# Patient Record
Sex: FEMALE | Race: BLACK OR AFRICAN AMERICAN | NOT HISPANIC OR LATINO | Employment: OTHER | URBAN - METROPOLITAN AREA
[De-identification: names, ages, dates, MRNs, and addresses within clinical notes are randomized per-mention and may not be internally consistent; named-entity substitution may affect disease eponyms.]

---

## 2022-08-17 ENCOUNTER — HOSPITAL ENCOUNTER (EMERGENCY)
Facility: HOSPITAL | Age: 67
Discharge: HOME | End: 2022-08-17
Attending: EMERGENCY MEDICINE
Payer: MEDICARE

## 2022-08-17 VITALS
DIASTOLIC BLOOD PRESSURE: 69 MMHG | HEART RATE: 92 BPM | SYSTOLIC BLOOD PRESSURE: 135 MMHG | TEMPERATURE: 99 F | RESPIRATION RATE: 16 BRPM | OXYGEN SATURATION: 99 %

## 2022-08-17 DIAGNOSIS — S09.90XA INJURY OF HEAD, INITIAL ENCOUNTER: Primary | ICD-10-CM

## 2022-08-17 PROCEDURE — 25000003 PHARM REV CODE 250: Performed by: EMERGENCY MEDICINE

## 2022-08-17 PROCEDURE — 99283 EMERGENCY DEPT VISIT LOW MDM: CPT

## 2022-08-17 RX ORDER — HYDROCODONE BITARTRATE AND ACETAMINOPHEN 5; 325 MG/1; MG/1
1 TABLET ORAL
Status: COMPLETED | OUTPATIENT
Start: 2022-08-17 | End: 2022-08-17

## 2022-08-17 RX ORDER — HYDROCODONE BITARTRATE AND ACETAMINOPHEN 5; 325 MG/1; MG/1
1 TABLET ORAL EVERY 8 HOURS PRN
Qty: 12 TABLET | Refills: 0 | Status: SHIPPED | OUTPATIENT
Start: 2022-08-17 | End: 2022-08-27

## 2022-08-17 RX ADMIN — HYDROCODONE BITARTRATE AND ACETAMINOPHEN 1 TABLET: 5; 325 TABLET ORAL at 11:08

## 2022-08-18 NOTE — ED PROVIDER NOTES
SCRIBE #1 NOTE: I, Luis Calderon, am scribing for, and in the presence of, Selin Torres MD. I have scribed the entire note.       History     Chief Complaint   Patient presents with    Head Injury     Wheelchair fell backwards on the [employer] bus. C/o pain to head, back, and neck. Denies blood thinners and LOC      Chest Pain     Mid sternal, no radiation, denies SOB     Review of patient's allergies indicates:  No Known Allergies      History of Present Illness     HPI    8/17/2022, 11:22 PM  History obtained from the patient      History of Present Illness: Fariha Corral is a 67 y.o. female patient who presents to the Emergency Department for evaluation of a head injury which onset suddenly at 16:30 today. Pt states she was in a wheel chair when she fell backwards on the [employer] bus. She c/o head, neck, and back pain. Symptoms are constant and moderate in severity. No mitigating or exacerbating factors reported. No associated sxs reported. Patient denies any syncope, weakness, numbness, dizziness, CP, SOB, abdominal pain, n/v/d, and all other sxs at this time. No prior Tx reported. No further complaints or concerns at this time.       Arrival mode: Personal vehicle    PCP: Primary Doctor No        Past Medical History:  No past medical history on file.    Past Surgical History:  No past surgical history on file.      Family History:  No family history on file.    Social History:  Social History     Tobacco Use    Smoking status: Not on file    Smokeless tobacco: Not on file   Substance and Sexual Activity    Alcohol use: Not on file    Drug use: Not on file    Sexual activity: Not on file        Review of Systems     Review of Systems   Constitutional: Negative for fever.   HENT: Negative for sore throat.    Respiratory: Negative for shortness of breath.    Cardiovascular: Negative for chest pain.   Gastrointestinal: Negative for abdominal pain, diarrhea, nausea and vomiting.   Genitourinary: Negative for  dysuria.   Musculoskeletal: Negative for back pain.        (+) Pain to occipital area of head, neck, and back   Skin: Negative for rash.   Neurological: Negative for dizziness, syncope, weakness and numbness.   Hematological: Does not bruise/bleed easily.   All other systems reviewed and are negative.     Physical Exam     Initial Vitals [08/17/22 2055]   BP Pulse Resp Temp SpO2   135/69 92 19 99.2 °F (37.3 °C) 99 %      MAP       --          Physical Exam   Nursing Notes and Vital Signs Reviewed.  Constitutional: Patient is in no acute distress. Well-developed and well-nourished.  Head: Mild swelling and tenderness to occipital area. Normocephalic.  Eyes: PERRL. EOM intact. Conjunctivae are not pale. No scleral icterus.  ENT: Mucous membranes are moist. Oropharynx is clear and symmetric.    Neck: Supple. Full ROM. No lymphadenopathy.  Cardiovascular: Regular rate. Regular rhythm. No murmurs, rubs, or gallops. Distal pulses are 2+ and symmetric.  Pulmonary/Chest: No respiratory distress. Clear to auscultation bilaterally. No wheezing or rales.  Abdominal: Soft and non-distended.  There is no tenderness.  No rebound, guarding, or rigidity. Good bowel sounds.  Genitourinary: No CVA tenderness  Musculoskeletal: Moves all extremities. No obvious deformities. No edema. No calf tenderness.  Skin: Warm and dry. LUE AV shunt has thrill.  Neurological:  Alert, awake, and appropriate.  Normal speech.  No acute focal neurological deficits are appreciated.  Psychiatric: Normal affect. Good eye contact. Appropriate in content.     ED Course   Procedures  ED Vital Signs:  Vitals:    08/17/22 2055 08/17/22 2332   BP: 135/69    Pulse: 92    Resp: 19 16   Temp: 99.2 °F (37.3 °C)    TempSrc: Oral    SpO2: 99%        Abnormal Lab Results:  Labs Reviewed - No data to display     All Lab Results:  None    Imaging Results:  Imaging Results    None        The EKG was ordered, reviewed, and independently interpreted by the ED  provider.  Interpretation time: 20:47  Rate: 90 BPM  Rhythm: Sinus rhythm with short VA  Interpretation: Possible Left atrial enlargement. St and T wave abnormality. No STEMI.        The Emergency Provider reviewed the vital signs and test results, which are outlined above.     ED Discussion       11:24 PM: Reassessed pt at this time. Discussed with pt all pertinent ED information and results. Discussed pt dx and plan of tx. Gave pt all f/u and return to the ED instructions. All questions and concerns were addressed at this time. Pt expresses understanding of information and instructions, and is comfortable with plan to discharge. Pt is stable for discharge.    I discussed with patient and/or family/caretaker that evaluation in the ED does not suggest any emergent or life threatening medical conditions requiring immediate intervention beyond what was provided in the ED, and I believe patient is safe for discharge.  Regardless, an unremarkable evaluation in the ED does not preclude the development or presence of a serious of life threatening condition. As such, patient was instructed to return immediately for any worsening or change in current symptoms.         Medical Decision Making:   Clinical Tests:   Medical Tests: Ordered and Reviewed           ED Medication(s):  Medications   HYDROcodone-acetaminophen 5-325 mg per tablet 1 tablet (1 tablet Oral Given 8/17/22 2332)       Discharge Medication List as of 8/17/2022 11:23 PM      START taking these medications    Details   HYDROcodone-acetaminophen (NORCO) 5-325 mg per tablet Take 1 tablet by mouth every 8 (eight) hours as needed for Pain., Starting Wed 8/17/2022, Until Sat 8/27/2022 at 2359, Print              Follow-up Information     [facility] In 2 days.    Contact information:  [address] [facility] [address] [phone]             [facility] - Emergency Dept..    Specialty: Emergency Medicine  Why: As needed, If symptoms worsen  Contact  information:  [address] [facility] [id number] [phone]                           Scribe Attestation:   Scribe #1: I performed the above scribed service and the documentation accurately describes the services I performed. I attest to the accuracy of the note.     Attending:   Physician Attestation Statement for Scribe #1: I, Selin Torres MD, personally performed the services described in this documentation, as scribed by Luis Calderon, in my presence, and it is both accurate and complete.           Clinical Impression       ICD-10-CM ICD-9-CM   1. Injury of head, initial encounter  S09.90XA 959.01       Disposition:   Disposition: Discharged  Condition: Stable         Selin Torres MD  08/18/22 0595

## 2022-09-29 ENCOUNTER — OFFICE VISIT (OUTPATIENT)
Dept: INTERNAL MEDICINE | Facility: CLINIC | Age: 67
End: 2022-09-29
Payer: MEDICARE

## 2022-09-29 ENCOUNTER — TELEPHONE (OUTPATIENT)
Dept: HEMATOLOGY/ONCOLOGY | Facility: CLINIC | Age: 67
End: 2022-09-29
Payer: MEDICARE

## 2022-09-29 ENCOUNTER — TELEPHONE (OUTPATIENT)
Dept: GASTROENTEROLOGY | Facility: CLINIC | Age: 67
End: 2022-09-29
Payer: MEDICARE

## 2022-09-29 ENCOUNTER — TELEPHONE (OUTPATIENT)
Dept: INTERNAL MEDICINE | Facility: CLINIC | Age: 67
End: 2022-09-29

## 2022-09-29 VITALS
HEART RATE: 83 BPM | SYSTOLIC BLOOD PRESSURE: 170 MMHG | OXYGEN SATURATION: 95 % | TEMPERATURE: 97 F | DIASTOLIC BLOOD PRESSURE: 80 MMHG

## 2022-09-29 DIAGNOSIS — N18.6 ESRD (END STAGE RENAL DISEASE): ICD-10-CM

## 2022-09-29 DIAGNOSIS — D64.9 ANEMIA, UNSPECIFIED TYPE: Primary | ICD-10-CM

## 2022-09-29 DIAGNOSIS — Z11.59 NEED FOR HEPATITIS C SCREENING TEST: ICD-10-CM

## 2022-09-29 DIAGNOSIS — N18.9 CKD (CHRONIC KIDNEY DISEASE): ICD-10-CM

## 2022-09-29 DIAGNOSIS — Z99.2 ESRD (END STAGE RENAL DISEASE) ON DIALYSIS: ICD-10-CM

## 2022-09-29 DIAGNOSIS — N18.6 ESRD (END STAGE RENAL DISEASE) ON DIALYSIS: ICD-10-CM

## 2022-09-29 DIAGNOSIS — Z79.899 ENCOUNTER FOR LONG-TERM (CURRENT) USE OF MEDICATIONS: ICD-10-CM

## 2022-09-29 DIAGNOSIS — R52 PHANTOM PAIN: ICD-10-CM

## 2022-09-29 DIAGNOSIS — D64.9 ANEMIA: Primary | ICD-10-CM

## 2022-09-29 DIAGNOSIS — I73.9 PAD (PERIPHERAL ARTERY DISEASE): ICD-10-CM

## 2022-09-29 DIAGNOSIS — G54.8 PHANTOM PAIN: ICD-10-CM

## 2022-09-29 DIAGNOSIS — Z87.19 HISTORY OF GI BLEED: ICD-10-CM

## 2022-09-29 PROCEDURE — 1159F PR MEDICATION LIST DOCUMENTED IN MEDICAL RECORD: ICD-10-PCS | Mod: CPTII,S$GLB,, | Performed by: FAMILY MEDICINE

## 2022-09-29 PROCEDURE — 99204 OFFICE O/P NEW MOD 45 MIN: CPT | Mod: S$GLB,,, | Performed by: FAMILY MEDICINE

## 2022-09-29 PROCEDURE — 3288F FALL RISK ASSESSMENT DOCD: CPT | Mod: CPTII,S$GLB,, | Performed by: FAMILY MEDICINE

## 2022-09-29 PROCEDURE — 3079F PR MOST RECENT DIASTOLIC BLOOD PRESSURE 80-89 MM HG: ICD-10-PCS | Mod: CPTII,S$GLB,, | Performed by: FAMILY MEDICINE

## 2022-09-29 PROCEDURE — 1101F PR PT FALLS ASSESS DOC 0-1 FALLS W/OUT INJ PAST YR: ICD-10-PCS | Mod: CPTII,S$GLB,, | Performed by: FAMILY MEDICINE

## 2022-09-29 PROCEDURE — 99999 PR PBB SHADOW E&M-EST. PATIENT-LVL IV: CPT | Mod: PBBFAC,,, | Performed by: FAMILY MEDICINE

## 2022-09-29 PROCEDURE — 4010F ACE/ARB THERAPY RXD/TAKEN: CPT | Mod: CPTII,S$GLB,, | Performed by: FAMILY MEDICINE

## 2022-09-29 PROCEDURE — 99204 PR OFFICE/OUTPT VISIT, NEW, LEVL IV, 45-59 MIN: ICD-10-PCS | Mod: S$GLB,,, | Performed by: FAMILY MEDICINE

## 2022-09-29 PROCEDURE — 3077F PR MOST RECENT SYSTOLIC BLOOD PRESSURE >= 140 MM HG: ICD-10-PCS | Mod: CPTII,S$GLB,, | Performed by: FAMILY MEDICINE

## 2022-09-29 PROCEDURE — 4010F PR ACE/ARB THEARPY RXD/TAKEN: ICD-10-PCS | Mod: CPTII,S$GLB,, | Performed by: FAMILY MEDICINE

## 2022-09-29 PROCEDURE — 3079F DIAST BP 80-89 MM HG: CPT | Mod: CPTII,S$GLB,, | Performed by: FAMILY MEDICINE

## 2022-09-29 PROCEDURE — 1159F MED LIST DOCD IN RCRD: CPT | Mod: CPTII,S$GLB,, | Performed by: FAMILY MEDICINE

## 2022-09-29 PROCEDURE — 99999 PR PBB SHADOW E&M-EST. PATIENT-LVL IV: ICD-10-PCS | Mod: PBBFAC,,, | Performed by: FAMILY MEDICINE

## 2022-09-29 PROCEDURE — 3077F SYST BP >= 140 MM HG: CPT | Mod: CPTII,S$GLB,, | Performed by: FAMILY MEDICINE

## 2022-09-29 PROCEDURE — 1101F PT FALLS ASSESS-DOCD LE1/YR: CPT | Mod: CPTII,S$GLB,, | Performed by: FAMILY MEDICINE

## 2022-09-29 PROCEDURE — 3288F PR FALLS RISK ASSESSMENT DOCUMENTED: ICD-10-PCS | Mod: CPTII,S$GLB,, | Performed by: FAMILY MEDICINE

## 2022-09-29 RX ORDER — CARVEDILOL 12.5 MG/1
12.5 TABLET ORAL 2 TIMES DAILY
COMMUNITY
Start: 2022-05-26

## 2022-09-29 RX ORDER — PAROXETINE 10 MG/1
10 TABLET, FILM COATED ORAL DAILY
COMMUNITY
Start: 2022-09-26

## 2022-09-29 RX ORDER — CALCIUM ACETATE 667 MG/1
667 CAPSULE ORAL
COMMUNITY
Start: 2022-07-13 | End: 2023-07-13

## 2022-09-29 RX ORDER — ATORVASTATIN CALCIUM 40 MG/1
TABLET, FILM COATED ORAL
COMMUNITY
Start: 2022-09-21

## 2022-09-29 RX ORDER — PANTOPRAZOLE SODIUM 40 MG/1
40 TABLET, DELAYED RELEASE ORAL
COMMUNITY
Start: 2022-07-13 | End: 2022-09-29 | Stop reason: SDUPTHER

## 2022-09-29 RX ORDER — FERROUS SULFATE 325(65) MG
TABLET ORAL
COMMUNITY

## 2022-09-29 RX ORDER — SODIUM ZIRCONIUM CYCLOSILICATE 5 G/5G
POWDER, FOR SUSPENSION ORAL
COMMUNITY
Start: 2022-05-09

## 2022-09-29 RX ORDER — SPIRONOLACTONE 50 MG/1
50 TABLET, FILM COATED ORAL DAILY
COMMUNITY
Start: 2022-09-21

## 2022-09-29 RX ORDER — GABAPENTIN 300 MG/1
300 CAPSULE ORAL 3 TIMES DAILY
Qty: 90 CAPSULE | Refills: 1 | Status: SHIPPED | OUTPATIENT
Start: 2022-09-29 | End: 2023-09-29

## 2022-09-29 RX ORDER — PANTOPRAZOLE SODIUM 40 MG/1
40 TABLET, DELAYED RELEASE ORAL 2 TIMES DAILY
Qty: 60 TABLET | Refills: 3 | Status: SHIPPED | OUTPATIENT
Start: 2022-09-29

## 2022-09-29 RX ORDER — AMLODIPINE BESYLATE 10 MG/1
TABLET ORAL
COMMUNITY

## 2022-09-29 RX ORDER — HYDROCODONE BITARTRATE AND ACETAMINOPHEN 5; 325 MG/1; MG/1
1 TABLET ORAL EVERY 8 HOURS PRN
COMMUNITY
Start: 2022-09-22

## 2022-09-29 RX ORDER — GABAPENTIN 300 MG/1
300 CAPSULE ORAL 3 TIMES DAILY
Qty: 90 CAPSULE | Refills: 1 | Status: SHIPPED | OUTPATIENT
Start: 2022-09-29 | End: 2022-09-29 | Stop reason: SDUPTHER

## 2022-09-29 NOTE — PROGRESS NOTES
Subjective:       Patient ID: Fariha Corral is a 67 y.o. female.    Chief Complaint: Establish Care    HPI          Past Medical History:   Diagnosis Date    Acid reflux     Chronic kidney disease, unspecified     Hypertension    ESRD  PAD  Upper GI  Anemia      Past Surgical History:   Procedure Laterality Date    AMPUTATION      HYSTERECTOMY         History reviewed. No pertinent family history.    Social History     Tobacco Use   Smoking Status Every Day    Types: Cigarettes   Smokeless Tobacco Current   Still smoking daily  1.5 /day      Wt Readings from Last 5 Encounters:   No data found for Wt       For further HPI details, see assessment and plan.    Review of Systems   Constitutional:  Negative for chills and fever.   Respiratory:  Negative for shortness of breath.    Cardiovascular:  Negative for chest pain.   Psychiatric/Behavioral:  Negative for confusion.      Objective:      Vitals:    09/29/22 0955   BP: (!) 170/80   Pulse: 83   Temp: 97 °F (36.1 °C)       Physical Exam  Constitutional:       General: She is not in acute distress.     Appearance: She is not ill-appearing.      Comments: In wheelchait. Double amputee - lower extremity   Pulmonary:      Effort: Pulmonary effort is normal. No respiratory distress.   Neurological:      General: No focal deficit present.      Mental Status: She is alert.   Psychiatric:         Mood and Affect: Mood normal.         Behavior: Behavior normal.       Assessment:       1. Anemia, unspecified type    2. History of GI bleed    3. ESRD (end stage renal disease)    4. Need for hepatitis C screening test    5. Phantom pain    6. PAD (peripheral artery disease)    7. Encounter for long-term (current) use of medications        Plan:         Patient presents with her niece to establish care.  It sounds as if she just moved from [place] and needs new doctors.  At present she is feeling without any complaints but does have concerns about her blood count as primary  issue.      She is recently had blood transfusions at outside hospitals.  She tells me she does not know why she has had such anemia but on further discussion she does have a history of GI bleeding.  It sounds as if it was upper GI bleeding.  I am going to get her started on b.i.d. Protonix and consult Gastroenterology for the next available appointment.  I have asked staff to arrange GI soon as possible.  Additionally we will consult Hematology for assistance in raising her blood counts.    Her blood pressures are uncontrolled today.  She reports they are widely variable and sometimes are controlled.  We will need to review this further and I suspect we will need to adjust her current medications.    Patient is a bilateral amputee.  She reports she had her legs amputated due to peripheral arterial disease.  Since that time she has suffered with phantom pain issues.  Gabapentin works well for her and she tolerates the medication at 300 mg twice daily.  I will refill that medication today.    Patient has end-stage renal disease on dialysis.  I can not see who her nephrologist is and she does not know.  Given she is transferring all of her care to [facility] we will get her established with Nephrology.  Until that time continue her current dialysis program.    Patient is a long-time heavy smoker.  This is exceedingly concerning given her clinical history.  She does think she will quit again in the future.  I strongly encouraged this.  We will explore pharmaceutical assistance if need be.    Rj request home health for assistance.  I will place an order and has come held to identify in which ways they can provide assistance for this patient.  I specifically asking for social work assistance to identify programs that are available to her.    Ms. Pemberton is a pleasant but complicated case.  To further complicate our establish care visit she missed her appointment time.  She was 30 minutes late to her 20 minute appointment.   I politely but firmly ask that they present on time for future visits.    Given the severe time limitations of today's visit I do ask they return in 1-2 weeks to further review her history in more properly establish care with 1 another.    Patient may be a good candidate for the [facility] program.  I will discuss this with him at our next visit.    Note dictated - excuse typos.

## 2022-09-29 NOTE — TELEPHONE ENCOUNTER
Spoke patient in reference to Hematology referral from Dr. Vasques.  Appointment scheduled per patient's request next available Tuesday/Thursday at the [facility].  Appointment notice mailed.

## 2022-09-29 NOTE — TELEPHONE ENCOUNTER
----- Message from Marcy Burgos sent at 9/29/2022  9:27 AM CDT -----  Contact: Thresa/ Daughter  Patients daughter is calling to speak to the nurse regarding today's appt. Reports patient was late and wanted to know if the patient can still be seen. Please give patient a call back at [phone]

## 2022-09-30 DIAGNOSIS — N18.30 STAGE 3 CHRONIC KIDNEY DISEASE, UNSPECIFIED WHETHER STAGE 3A OR 3B CKD: Primary | ICD-10-CM

## 2022-10-01 PROCEDURE — G0180 PR HOME HEALTH MD CERTIFICATION: ICD-10-PCS | Mod: ,,, | Performed by: FAMILY MEDICINE

## 2022-10-01 PROCEDURE — G0180 MD CERTIFICATION HHA PATIENT: HCPCS | Mod: ,,, | Performed by: FAMILY MEDICINE

## 2022-10-03 ENCOUNTER — LAB VISIT (OUTPATIENT)
Dept: LAB | Facility: HOSPITAL | Age: 67
End: 2022-10-03
Attending: FAMILY MEDICINE
Payer: MEDICARE

## 2022-10-03 ENCOUNTER — OFFICE VISIT (OUTPATIENT)
Dept: GASTROENTEROLOGY | Facility: CLINIC | Age: 67
End: 2022-10-03
Payer: MEDICARE

## 2022-10-03 VITALS
WEIGHT: 110 LBS | OXYGEN SATURATION: 94 % | DIASTOLIC BLOOD PRESSURE: 70 MMHG | SYSTOLIC BLOOD PRESSURE: 148 MMHG | HEART RATE: 82 BPM

## 2022-10-03 DIAGNOSIS — Z79.899 ENCOUNTER FOR LONG-TERM (CURRENT) USE OF MEDICATIONS: ICD-10-CM

## 2022-10-03 DIAGNOSIS — D64.9 SEVERE ANEMIA: ICD-10-CM

## 2022-10-03 DIAGNOSIS — D64.9 ANEMIA: ICD-10-CM

## 2022-10-03 DIAGNOSIS — N18.30 STAGE 3 CHRONIC KIDNEY DISEASE, UNSPECIFIED WHETHER STAGE 3A OR 3B CKD: ICD-10-CM

## 2022-10-03 DIAGNOSIS — Z99.2 ESRD (END STAGE RENAL DISEASE) ON DIALYSIS: ICD-10-CM

## 2022-10-03 DIAGNOSIS — D64.9 ANEMIA, UNSPECIFIED TYPE: ICD-10-CM

## 2022-10-03 DIAGNOSIS — Z87.19 HISTORY OF GI BLEED: ICD-10-CM

## 2022-10-03 DIAGNOSIS — N18.6 ESRD ON DIALYSIS: ICD-10-CM

## 2022-10-03 DIAGNOSIS — N18.6 ESRD (END STAGE RENAL DISEASE): ICD-10-CM

## 2022-10-03 DIAGNOSIS — K31.811 AVM (ARTERIOVENOUS MALFORMATION) OF DUODENUM, ACQUIRED WITH HEMORRHAGE: Primary | ICD-10-CM

## 2022-10-03 DIAGNOSIS — Z11.59 NEED FOR HEPATITIS C SCREENING TEST: ICD-10-CM

## 2022-10-03 DIAGNOSIS — Z99.2 ESRD ON DIALYSIS: ICD-10-CM

## 2022-10-03 DIAGNOSIS — N18.6 ESRD (END STAGE RENAL DISEASE) ON DIALYSIS: ICD-10-CM

## 2022-10-03 DIAGNOSIS — I73.9 PAD (PERIPHERAL ARTERY DISEASE): ICD-10-CM

## 2022-10-03 LAB
BASOPHILS # BLD AUTO: 0.04 K/UL (ref 0–0.2)
BASOPHILS NFR BLD: 0.6 % (ref 0–1.9)
CREAT UR-MCNC: 51 MG/DL (ref 15–325)
DIFFERENTIAL METHOD: ABNORMAL
EOSINOPHIL # BLD AUTO: 0.1 K/UL (ref 0–0.5)
EOSINOPHIL NFR BLD: 0.9 % (ref 0–8)
ERYTHROCYTE [DISTWIDTH] IN BLOOD BY AUTOMATED COUNT: 18.6 % (ref 11.5–14.5)
ESTIMATED AVG GLUCOSE: 74 MG/DL (ref 68–131)
HBA1C MFR BLD: 4.2 % (ref 4–5.6)
HCT VFR BLD AUTO: 24.8 % (ref 37–48.5)
HGB BLD-MCNC: 7.4 G/DL (ref 12–16)
IMM GRANULOCYTES # BLD AUTO: 0.05 K/UL (ref 0–0.04)
IMM GRANULOCYTES NFR BLD AUTO: 0.7 % (ref 0–0.5)
LYMPHOCYTES # BLD AUTO: 0.6 K/UL (ref 1–4.8)
LYMPHOCYTES NFR BLD: 9.1 % (ref 18–48)
MCH RBC QN AUTO: 32 PG (ref 27–31)
MCHC RBC AUTO-ENTMCNC: 29.8 G/DL (ref 32–36)
MCV RBC AUTO: 107 FL (ref 82–98)
MONOCYTES # BLD AUTO: 0.5 K/UL (ref 0.3–1)
MONOCYTES NFR BLD: 7.2 % (ref 4–15)
NEUTROPHILS # BLD AUTO: 5.5 K/UL (ref 1.8–7.7)
NEUTROPHILS NFR BLD: 81.5 % (ref 38–73)
NRBC BLD-RTO: 0 /100 WBC
PLATELET # BLD AUTO: 233 K/UL (ref 150–450)
PMV BLD AUTO: 11.1 FL (ref 9.2–12.9)
PROT UR-MCNC: 317 MG/DL (ref 0–15)
PROT/CREAT UR: 6.22 MG/G{CREAT} (ref 0–0.2)
RBC # BLD AUTO: 2.31 M/UL (ref 4–5.4)
WBC # BLD AUTO: 6.79 K/UL (ref 3.9–12.7)

## 2022-10-03 PROCEDURE — 4010F ACE/ARB THERAPY RXD/TAKEN: CPT | Mod: CPTII,S$GLB,, | Performed by: NURSE PRACTITIONER

## 2022-10-03 PROCEDURE — 99999 PR PBB SHADOW E&M-EST. PATIENT-LVL V: ICD-10-PCS | Mod: PBBFAC,,, | Performed by: NURSE PRACTITIONER

## 2022-10-03 PROCEDURE — 1159F MED LIST DOCD IN RCRD: CPT | Mod: CPTII,S$GLB,, | Performed by: NURSE PRACTITIONER

## 2022-10-03 PROCEDURE — 83036 HEMOGLOBIN GLYCOSYLATED A1C: CPT | Performed by: FAMILY MEDICINE

## 2022-10-03 PROCEDURE — 86803 HEPATITIS C AB TEST: CPT | Performed by: FAMILY MEDICINE

## 2022-10-03 PROCEDURE — 82746 ASSAY OF FOLIC ACID SERUM: CPT | Performed by: NURSE PRACTITIONER

## 2022-10-03 PROCEDURE — 4010F PR ACE/ARB THEARPY RXD/TAKEN: ICD-10-PCS | Mod: CPTII,S$GLB,, | Performed by: NURSE PRACTITIONER

## 2022-10-03 PROCEDURE — 99999 PR PBB SHADOW E&M-EST. PATIENT-LVL V: CPT | Mod: PBBFAC,,, | Performed by: NURSE PRACTITIONER

## 2022-10-03 PROCEDURE — 80061 LIPID PANEL: CPT | Performed by: FAMILY MEDICINE

## 2022-10-03 PROCEDURE — 1126F PR PAIN SEVERITY QUANTIFIED, NO PAIN PRESENT: ICD-10-PCS | Mod: CPTII,S$GLB,, | Performed by: NURSE PRACTITIONER

## 2022-10-03 PROCEDURE — 3077F PR MOST RECENT SYSTOLIC BLOOD PRESSURE >= 140 MM HG: ICD-10-PCS | Mod: CPTII,S$GLB,, | Performed by: NURSE PRACTITIONER

## 2022-10-03 PROCEDURE — 1159F PR MEDICATION LIST DOCUMENTED IN MEDICAL RECORD: ICD-10-PCS | Mod: CPTII,S$GLB,, | Performed by: NURSE PRACTITIONER

## 2022-10-03 PROCEDURE — 36415 COLL VENOUS BLD VENIPUNCTURE: CPT | Performed by: NURSE PRACTITIONER

## 2022-10-03 PROCEDURE — 1126F AMNT PAIN NOTED NONE PRSNT: CPT | Mod: CPTII,S$GLB,, | Performed by: NURSE PRACTITIONER

## 2022-10-03 PROCEDURE — 99204 OFFICE O/P NEW MOD 45 MIN: CPT | Mod: S$GLB,,, | Performed by: NURSE PRACTITIONER

## 2022-10-03 PROCEDURE — 84156 ASSAY OF PROTEIN URINE: CPT | Performed by: INTERNAL MEDICINE

## 2022-10-03 PROCEDURE — 82607 VITAMIN B-12: CPT | Performed by: NURSE PRACTITIONER

## 2022-10-03 PROCEDURE — 3078F DIAST BP <80 MM HG: CPT | Mod: CPTII,S$GLB,, | Performed by: NURSE PRACTITIONER

## 2022-10-03 PROCEDURE — 84466 ASSAY OF TRANSFERRIN: CPT | Performed by: FAMILY MEDICINE

## 2022-10-03 PROCEDURE — 85025 COMPLETE CBC W/AUTO DIFF WBC: CPT | Performed by: NURSE PRACTITIONER

## 2022-10-03 PROCEDURE — 1160F RVW MEDS BY RX/DR IN RCRD: CPT | Mod: CPTII,S$GLB,, | Performed by: NURSE PRACTITIONER

## 2022-10-03 PROCEDURE — 3078F PR MOST RECENT DIASTOLIC BLOOD PRESSURE < 80 MM HG: ICD-10-PCS | Mod: CPTII,S$GLB,, | Performed by: NURSE PRACTITIONER

## 2022-10-03 PROCEDURE — 99204 PR OFFICE/OUTPT VISIT, NEW, LEVL IV, 45-59 MIN: ICD-10-PCS | Mod: S$GLB,,, | Performed by: NURSE PRACTITIONER

## 2022-10-03 PROCEDURE — 1160F PR REVIEW ALL MEDS BY PRESCRIBER/CLIN PHARMACIST DOCUMENTED: ICD-10-PCS | Mod: CPTII,S$GLB,, | Performed by: NURSE PRACTITIONER

## 2022-10-03 PROCEDURE — 82728 ASSAY OF FERRITIN: CPT | Performed by: FAMILY MEDICINE

## 2022-10-03 PROCEDURE — 3077F SYST BP >= 140 MM HG: CPT | Mod: CPTII,S$GLB,, | Performed by: NURSE PRACTITIONER

## 2022-10-03 RX ORDER — POLYETHYLENE GLYCOL 3350, SODIUM SULFATE ANHYDROUS, SODIUM BICARBONATE, SODIUM CHLORIDE, POTASSIUM CHLORIDE 236; 22.74; 6.74; 5.86; 2.97 G/4L; G/4L; G/4L; G/4L; G/4L
4 POWDER, FOR SOLUTION ORAL ONCE
Qty: 4000 ML | Refills: 0 | Status: SHIPPED | OUTPATIENT
Start: 2022-10-03 | End: 2022-10-03

## 2022-10-03 NOTE — PROGRESS NOTES
Clinic Consult:  [facility] Consultation Note    Reason for Consult:  The primary encounter diagnosis was AVM (arteriovenous malformation) of duodenum, acquired with hemorrhage. Diagnoses of Severe anemia, ESRD on dialysis, and History of GI bleed were also pertinent to this visit.    PCP: Primary Doctor No   [id number] / [place] [address]    HPI:  This is a 67 y.o. female here for evaluation of UGI bleed.   She has recently relocated from [place] earlier this year.   She has a history of ESRD on HD (MWF), DM type 2, PVD with bilateral AKA, HTN, HLD.     She reports having been diagnosed with UGI bleed while being followed by Gastroenterologist in [place], [place]. She does not know their name or when her last EGD and colonoscopy was with them. She reports having chronic hematemesis with black emesis while living there that would occur intermittently.     Upon quick Internet search, I believe she may have been seeing:  [facility]  Address: [address] [place] [address], [place] [address]  Phone: [phone]    In July 2022, she was admitted to [facility] for severe anemia with Hgb 2.9  Hospital notes reviewed. She was having abdominal pain at that time with melena. She received multiple units of blood during that hospital stay.   She had inpatient EGD that showed actively bleeding duodenal AVM that was treated with cautery and clip. She was also placed on PPI.     On 9/27/22 she was sent back to ED for Hgb 7.0. She received 1 unit PRBC with increase in hgb to 8.3. She reports having melena a couple of weeks ago but is no longer having any overt GI bleeding. No hematemesis.     She was referred for evaluation of continued GI bleeding as the cause of recurrent anemia. Her anemia is macrocytic. She has iron studies pending. She denies any NSAID use or blood thinner use.     Review of Systems   Constitutional:  Positive for malaise/fatigue. Negative for fever and weight  loss.   HENT:  Negative for sore throat.    Respiratory:  Negative for cough and wheezing.    Cardiovascular:  Negative for chest pain and palpitations.   Gastrointestinal:  Positive for melena. Negative for abdominal pain, blood in stool, constipation, diarrhea, heartburn, nausea and vomiting.   Genitourinary:  Negative for dysuria and frequency.   Musculoskeletal:  Negative for back pain, joint pain, myalgias and neck pain.   Skin:  Negative for itching and rash.   Neurological:  Negative for dizziness, speech change, seizures, loss of consciousness and headaches.   Psychiatric/Behavioral:  Negative for depression and substance abuse. The patient is not nervous/anxious.      Medical History:  has a past medical history of Acid reflux, Chronic kidney disease, unspecified, and Hypertension.    Surgical History:  has a past surgical history that includes Hysterectomy and Amputation.    Family History: family history is not on file..     Social History:  reports that she has been smoking cigarettes. She uses smokeless tobacco. She reports that she does not drink alcohol.    Allergies: Reviewed    Home Medications:   Current Outpatient Medications on File Prior to Visit   Medication Sig Dispense Refill    amLODIPine (NORVASC) 10 MG tablet amlodipine 10 mg tablet      atorvastatin (LIPITOR) 40 MG tablet SMARTSI Tablet(s) By Mouth Every Evening      calcium acetate,phosphat bind, (PHOSLO) 667 mg capsule Take 667 mg by mouth.      carvediloL (COREG) 12.5 MG tablet Take 12.5 mg by mouth 2 (two) times daily.      ferrous sulfate (FEOSOL) 325 mg (65 mg iron) Tab tablet ferrous sulfate 325 mg (65 mg iron) tablet   TAKE ONE TABLET BY MOUTH ONE TIME DAILY FOR 30 DAYS      gabapentin (NEURONTIN) 300 MG capsule Take 1 capsule (300 mg total) by mouth 3 (three) times daily. 90 capsule 1    HYDROcodone-acetaminophen (NORCO) 5-325 mg per tablet Take 1 tablet by mouth every 8 (eight) hours as needed.      LOKELMA 5 gram packet Take  by mouth 3 (three) times a week.      pantoprazole (PROTONIX) 40 MG tablet Take 1 tablet (40 mg total) by mouth 2 (two) times daily. 60 tablet 3    paroxetine (PAXIL) 10 MG tablet Take 10 mg by mouth once daily.      spironolactone (ALDACTONE) 50 MG tablet Take 50 mg by mouth once daily.       No current facility-administered medications on file prior to visit.       Physical Exam:  BP (!) 148/70   Pulse 82   Wt 49.9 kg (110 lb)   SpO2 (!) 94%   There is no height or weight on file to calculate BMI.  Physical Exam  Constitutional:       General: She is not in acute distress.  HENT:      Head: Normocephalic and atraumatic.   Eyes:      General: No scleral icterus.     Conjunctiva/sclera: Conjunctivae normal.   Cardiovascular:      Rate and Rhythm: Normal rate and regular rhythm.      Heart sounds: No murmur heard.  Pulmonary:      Effort: Pulmonary effort is normal. No respiratory distress.      Breath sounds: Normal breath sounds. No wheezing.   Abdominal:      General: Abdomen is flat. Bowel sounds are normal.      Palpations: Abdomen is soft.      Tenderness: There is no abdominal tenderness.   Musculoskeletal:      Right Lower Extremity: Right leg is amputated above knee.      Left Lower Extremity: Left leg is amputated above knee.   Skin:     General: Skin is warm and dry.   Neurological:      General: No focal deficit present.      Mental Status: She is alert and oriented to person, place, and time.      Cranial Nerves: No cranial nerve deficit.   Psychiatric:         Mood and Affect: Mood normal.         Judgment: Judgment normal.       Labs: Pertinent labs reviewed.    Assessment:  1. AVM (arteriovenous malformation) of duodenum, acquired with hemorrhage    2. Severe anemia    3. ESRD on dialysis    4. History of GI bleed      Patient with severe recurrent anemia of.  Has known bleeding duodenal AVM that was treated in July 2022.  She reports intermittent melanotic stools. Last seen in 2 weeks ago.  Iron  studies pending.  She also has ESRD on dialysis which is likely also contributing to significant anemia.    Recommendations:   - needs repeat EGD and colonoscopy to evaluate for bleeding AVMs among other causes of GI bleeding.   - add repeat CBC to already drawn labs to assess for stability and safety for procedure.     AVM (arteriovenous malformation) of duodenum, acquired with hemorrhage  -     Case Request Endoscopy: EGD (ESOPHAGOGASTRODUODENOSCOPY), COLONOSCOPY  -     Ambulatory referral/consult to Endo Procedure ; Future; Expected date: 10/04/2022  -     polyethylene glycol (GOLYTELY) 236-22.74-6.74 -5.86 gram suspension; Take 4,000 mLs (4 L total) by mouth once. for 1 dose  Dispense: 4000 mL; Refill: 0    Severe anemia  -     Case Request Endoscopy: EGD (ESOPHAGOGASTRODUODENOSCOPY), COLONOSCOPY  -     Ambulatory referral/consult to Endo Procedure ; Future; Expected date: 10/04/2022  -     polyethylene glycol (GOLYTELY) 236-22.74-6.74 -5.86 gram suspension; Take 4,000 mLs (4 L total) by mouth once. for 1 dose  Dispense: 4000 mL; Refill: 0  -     CBC Auto Differential; Future; Expected date: 10/03/2022    ESRD on dialysis    History of GI bleed  -     Ambulatory referral/consult to Gastroenterology  -     Case Request Endoscopy: EGD (ESOPHAGOGASTRODUODENOSCOPY), COLONOSCOPY  -     Ambulatory referral/consult to Endo Procedure ; Future; Expected date: 10/04/2022  -     polyethylene glycol (GOLYTELY) 236-22.74-6.74 -5.86 gram suspension; Take 4,000 mLs (4 L total) by mouth once. for 1 dose  Dispense: 4000 mL; Refill: 0    Follow up to be determined after results/ procedure(s).    Thank you so much for allowing me to participate in the care of JAKI Russo

## 2022-10-04 ENCOUNTER — TELEPHONE (OUTPATIENT)
Dept: INTERNAL MEDICINE | Facility: CLINIC | Age: 67
End: 2022-10-04
Payer: MEDICARE

## 2022-10-04 DIAGNOSIS — D64.9 SEVERE ANEMIA: Primary | ICD-10-CM

## 2022-10-04 LAB
CHOLEST SERPL-MCNC: 151 MG/DL (ref 120–199)
CHOLEST/HDLC SERPL: 2.7 {RATIO} (ref 2–5)
FERRITIN SERPL-MCNC: 2057 NG/ML (ref 20–300)
FOLATE SERPL-MCNC: 4.3 NG/ML (ref 4–24)
HCV AB SERPL QL IA: NORMAL
HDLC SERPL-MCNC: 55 MG/DL (ref 40–75)
HDLC SERPL: 36.4 % (ref 20–50)
IRON SERPL-MCNC: 37 UG/DL (ref 30–160)
LDLC SERPL CALC-MCNC: 78.4 MG/DL (ref 63–159)
NONHDLC SERPL-MCNC: 96 MG/DL
SATURATED IRON: 17 % (ref 20–50)
TOTAL IRON BINDING CAPACITY: 212 UG/DL (ref 250–450)
TRANSFERRIN SERPL-MCNC: 143 MG/DL (ref 200–375)
TRIGL SERPL-MCNC: 88 MG/DL (ref 30–150)
VIT B12 SERPL-MCNC: 399 PG/ML (ref 210–950)

## 2022-10-04 NOTE — PROGRESS NOTES
Please let patient know that she has low normal B12 levels.  Would like her to start B12 2662-3233 mcg SL daily.  She can get this OTC.  Thank you,  Anahi

## 2022-10-06 ENCOUNTER — HOSPITAL ENCOUNTER (OUTPATIENT)
Dept: PREADMISSION TESTING | Facility: HOSPITAL | Age: 67
Discharge: HOME | End: 2022-10-06
Attending: NURSE PRACTITIONER
Payer: MEDICARE

## 2022-10-06 ENCOUNTER — HOSPITAL ENCOUNTER (EMERGENCY)
Facility: HOSPITAL | Age: 67
Discharge: HOME | End: 2022-10-07
Attending: EMERGENCY MEDICINE
Payer: MEDICARE

## 2022-10-06 ENCOUNTER — TELEPHONE (OUTPATIENT)
Dept: NEPHROLOGY | Facility: CLINIC | Age: 67
End: 2022-10-06
Payer: MEDICARE

## 2022-10-06 VITALS
WEIGHT: 112.44 LBS | SYSTOLIC BLOOD PRESSURE: 144 MMHG | HEART RATE: 94 BPM | TEMPERATURE: 98 F | DIASTOLIC BLOOD PRESSURE: 78 MMHG | RESPIRATION RATE: 16 BRPM | OXYGEN SATURATION: 97 %

## 2022-10-06 DIAGNOSIS — Z89.511 S/P BILATERAL BKA (BELOW KNEE AMPUTATION): ICD-10-CM

## 2022-10-06 DIAGNOSIS — E87.5 HYPERKALEMIA: ICD-10-CM

## 2022-10-06 DIAGNOSIS — K31.811 AVM (ARTERIOVENOUS MALFORMATION) OF DUODENUM, ACQUIRED WITH HEMORRHAGE: ICD-10-CM

## 2022-10-06 DIAGNOSIS — Z91.199 NONCOMPLIANCE: ICD-10-CM

## 2022-10-06 DIAGNOSIS — D64.9 SEVERE ANEMIA: ICD-10-CM

## 2022-10-06 DIAGNOSIS — I10 CHRONIC HYPERTENSION: ICD-10-CM

## 2022-10-06 DIAGNOSIS — Z89.512 S/P BILATERAL BKA (BELOW KNEE AMPUTATION): ICD-10-CM

## 2022-10-06 DIAGNOSIS — R06.02 SHORTNESS OF BREATH: ICD-10-CM

## 2022-10-06 DIAGNOSIS — Z87.19 HISTORY OF GI BLEED: ICD-10-CM

## 2022-10-06 DIAGNOSIS — N18.6 ESRD (END STAGE RENAL DISEASE) ON DIALYSIS: Primary | ICD-10-CM

## 2022-10-06 DIAGNOSIS — Z99.2 ESRD (END STAGE RENAL DISEASE) ON DIALYSIS: Primary | ICD-10-CM

## 2022-10-06 LAB
ALBUMIN SERPL BCP-MCNC: 3.2 G/DL (ref 3.5–5.2)
ALP SERPL-CCNC: 59 U/L (ref 55–135)
ALT SERPL W/O P-5'-P-CCNC: 11 U/L (ref 10–44)
ANION GAP SERPL CALC-SCNC: 15 MMOL/L (ref 8–16)
AST SERPL-CCNC: 13 U/L (ref 10–40)
BASOPHILS # BLD AUTO: 0.05 K/UL (ref 0–0.2)
BASOPHILS NFR BLD: 0.8 % (ref 0–1.9)
BILIRUB SERPL-MCNC: 0.7 MG/DL (ref 0.1–1)
BUN SERPL-MCNC: 94 MG/DL (ref 8–23)
CALCIUM SERPL-MCNC: 9.1 MG/DL (ref 8.7–10.5)
CHLORIDE SERPL-SCNC: 106 MMOL/L (ref 95–110)
CO2 SERPL-SCNC: 17 MMOL/L (ref 23–29)
CREAT SERPL-MCNC: 10.2 MG/DL (ref 0.5–1.4)
DIFFERENTIAL METHOD: ABNORMAL
EOSINOPHIL # BLD AUTO: 0 K/UL (ref 0–0.5)
EOSINOPHIL NFR BLD: 0.5 % (ref 0–8)
ERYTHROCYTE [DISTWIDTH] IN BLOOD BY AUTOMATED COUNT: 17.9 % (ref 11.5–14.5)
EST. GFR  (NO RACE VARIABLE): 4 ML/MIN/1.73 M^2
GLUCOSE SERPL-MCNC: 69 MG/DL (ref 70–110)
HCT VFR BLD AUTO: 27.3 % (ref 37–48.5)
HGB BLD-MCNC: 8.5 G/DL (ref 12–16)
IMM GRANULOCYTES # BLD AUTO: 0.03 K/UL (ref 0–0.04)
IMM GRANULOCYTES NFR BLD AUTO: 0.5 % (ref 0–0.5)
LYMPHOCYTES # BLD AUTO: 0.5 K/UL (ref 1–4.8)
LYMPHOCYTES NFR BLD: 8.3 % (ref 18–48)
MAGNESIUM SERPL-MCNC: 2.3 MG/DL (ref 1.6–2.6)
MCH RBC QN AUTO: 31 PG (ref 27–31)
MCHC RBC AUTO-ENTMCNC: 31.1 G/DL (ref 32–36)
MCV RBC AUTO: 100 FL (ref 82–98)
MONOCYTES # BLD AUTO: 0.4 K/UL (ref 0.3–1)
MONOCYTES NFR BLD: 7.3 % (ref 4–15)
NEUTROPHILS # BLD AUTO: 4.9 K/UL (ref 1.8–7.7)
NEUTROPHILS NFR BLD: 82.6 % (ref 38–73)
NRBC BLD-RTO: 0 /100 WBC
PHOSPHATE SERPL-MCNC: 9.9 MG/DL (ref 2.7–4.5)
PLATELET # BLD AUTO: 262 K/UL (ref 150–450)
PMV BLD AUTO: 10.7 FL (ref 9.2–12.9)
POCT GLUCOSE: 86 MG/DL (ref 70–110)
POTASSIUM SERPL-SCNC: 6.2 MMOL/L (ref 3.5–5.1)
PROT SERPL-MCNC: 6.5 G/DL (ref 6–8.4)
RBC # BLD AUTO: 2.74 M/UL (ref 4–5.4)
SARS-COV-2 RDRP RESP QL NAA+PROBE: NEGATIVE
SODIUM SERPL-SCNC: 138 MMOL/L (ref 136–145)
TROPONIN I SERPL DL<=0.01 NG/ML-MCNC: 0.08 NG/ML (ref 0–0.03)
WBC # BLD AUTO: 5.91 K/UL (ref 3.9–12.7)

## 2022-10-06 PROCEDURE — 96375 TX/PRO/DX INJ NEW DRUG ADDON: CPT | Mod: 59

## 2022-10-06 PROCEDURE — 99285 EMERGENCY DEPT VISIT HI MDM: CPT | Mod: 25

## 2022-10-06 PROCEDURE — 96365 THER/PROPH/DIAG IV INF INIT: CPT

## 2022-10-06 PROCEDURE — 85025 COMPLETE CBC W/AUTO DIFF WBC: CPT | Performed by: EMERGENCY MEDICINE

## 2022-10-06 PROCEDURE — 25000003 PHARM REV CODE 250: Performed by: EMERGENCY MEDICINE

## 2022-10-06 PROCEDURE — 83735 ASSAY OF MAGNESIUM: CPT | Performed by: EMERGENCY MEDICINE

## 2022-10-06 PROCEDURE — 84484 ASSAY OF TROPONIN QUANT: CPT | Performed by: EMERGENCY MEDICINE

## 2022-10-06 PROCEDURE — 93010 ELECTROCARDIOGRAM REPORT: CPT | Mod: ,,, | Performed by: INTERNAL MEDICINE

## 2022-10-06 PROCEDURE — 80053 COMPREHEN METABOLIC PANEL: CPT | Performed by: EMERGENCY MEDICINE

## 2022-10-06 PROCEDURE — 63600175 PHARM REV CODE 636 W HCPCS: Performed by: EMERGENCY MEDICINE

## 2022-10-06 PROCEDURE — 80100014 HC HEMODIALYSIS 1:1

## 2022-10-06 PROCEDURE — 82962 GLUCOSE BLOOD TEST: CPT

## 2022-10-06 PROCEDURE — U0002 COVID-19 LAB TEST NON-CDC: HCPCS | Performed by: EMERGENCY MEDICINE

## 2022-10-06 PROCEDURE — G0257 UNSCHED DIALYSIS ESRD PT HOS: HCPCS

## 2022-10-06 PROCEDURE — 93005 ELECTROCARDIOGRAM TRACING: CPT

## 2022-10-06 PROCEDURE — 93010 EKG 12-LEAD: ICD-10-PCS | Mod: ,,, | Performed by: INTERNAL MEDICINE

## 2022-10-06 PROCEDURE — 84100 ASSAY OF PHOSPHORUS: CPT | Performed by: EMERGENCY MEDICINE

## 2022-10-06 RX ORDER — SODIUM CHLORIDE 9 MG/ML
INJECTION, SOLUTION INTRAVENOUS ONCE
Status: CANCELLED | OUTPATIENT
Start: 2022-10-06 | End: 2022-10-06

## 2022-10-06 RX ORDER — AMLODIPINE BESYLATE 5 MG/1
10 TABLET ORAL
Status: COMPLETED | OUTPATIENT
Start: 2022-10-06 | End: 2022-10-06

## 2022-10-06 RX ORDER — HYDRALAZINE HYDROCHLORIDE 20 MG/ML
10 INJECTION INTRAMUSCULAR; INTRAVENOUS
Status: COMPLETED | OUTPATIENT
Start: 2022-10-06 | End: 2022-10-06

## 2022-10-06 RX ORDER — MUPIROCIN 20 MG/G
OINTMENT TOPICAL 2 TIMES DAILY
Status: DISCONTINUED | OUTPATIENT
Start: 2022-10-06 | End: 2022-10-07 | Stop reason: HOSPADM

## 2022-10-06 RX ORDER — CALCIUM GLUCONATE 20 MG/ML
1 INJECTION, SOLUTION INTRAVENOUS
Status: COMPLETED | OUTPATIENT
Start: 2022-10-06 | End: 2022-10-06

## 2022-10-06 RX ORDER — CALCIUM GLUCONATE 20 MG/ML
1 INJECTION, SOLUTION INTRAVENOUS EVERY 10 MIN PRN
Status: DISCONTINUED | OUTPATIENT
Start: 2022-10-06 | End: 2022-10-07 | Stop reason: HOSPADM

## 2022-10-06 RX ORDER — SODIUM CHLORIDE 9 MG/ML
INJECTION, SOLUTION INTRAVENOUS
Status: CANCELLED | OUTPATIENT
Start: 2022-10-06

## 2022-10-06 RX ADMIN — SODIUM ZIRCONIUM CYCLOSILICATE 10 G: 5 POWDER, FOR SUSPENSION ORAL at 05:10

## 2022-10-06 RX ADMIN — HYDRALAZINE HYDROCHLORIDE 10 MG: 20 INJECTION, SOLUTION INTRAMUSCULAR; INTRAVENOUS at 06:10

## 2022-10-06 RX ADMIN — CALCIUM GLUCONATE 1 G: 20 INJECTION, SOLUTION INTRAVENOUS at 05:10

## 2022-10-06 RX ADMIN — AMLODIPINE BESYLATE 10 MG: 5 TABLET ORAL at 06:10

## 2022-10-06 NOTE — TELEPHONE ENCOUNTER
Lady on the phone said that pt is at ed as she missed hd x 2 tx s. I told her she wont be coming in to clinic anyway as she is on hd. They do not come in the the clinic here but handle all needs thru the hd unit that she goes to right now, the sw there can arrange her transfer to our hd unit. Voiced understanding. 10/6/22/sf

## 2022-10-06 NOTE — ED PROVIDER NOTES
"SCRIBE #1 NOTE: I, Di Trish, am scribing for, and in the presence of, Elizabet Do MD. I have scribed the entire note.       History     Chief Complaint   Patient presents with    Abdominal Pain     Abdominal pain, diarrhea.  Missed last 2 dialysis appts     Review of patient's allergies indicates:   Allergen Reactions    Aspirin Hives    Latex          History of Present Illness     HPI    10/6/2022, 12:51 PM  History obtained from the patient      History of Present Illness: Fariha Corral is a 67 y.o. female patient with a PMHx of HTN and CKD who presents to the Emergency Department for evaluation of abdominal pain which onset a couple days ago. Pt states she has not been to dialysis since last week because "she was hurting too bad to get dressed". Pt states abdominal pain has worsened since she began taking Protonix.  Symptoms are constant and moderate in severity. Associated sxs include diarrhea. Patient denies any N/V, fever, chills, CP, SOB, HA, and all other sxs at this time. No prior Tx reported. No further complaints or concerns at this time.       Arrival mode: EMS     PCP: Primary Doctor No        Past Medical History:  Past Medical History:   Diagnosis Date    Acid reflux     Chronic kidney disease, unspecified     Hypertension        Past Surgical History:  Past Surgical History:   Procedure Laterality Date    AMPUTATION      HYSTERECTOMY           Family History:  No family history on file.    Social History:  Social History     Tobacco Use    Smoking status: Every Day     Types: Cigarettes    Smokeless tobacco: Current   Substance and Sexual Activity    Alcohol use: Never    Drug use: Not on file    Sexual activity: Not on file        Review of Systems     Review of Systems   Constitutional:  Negative for chills and fever.   HENT:  Negative for sore throat.    Respiratory:  Negative for shortness of breath.    Cardiovascular:  Negative for chest pain.   Gastrointestinal:  Positive for " abdominal pain and diarrhea. Negative for nausea and vomiting.   Genitourinary:  Negative for dysuria.   Musculoskeletal:  Negative for back pain.   Skin:  Negative for rash.   Neurological:  Negative for weakness and headaches.   Hematological:  Does not bruise/bleed easily.   All other systems reviewed and are negative.     Physical Exam     Initial Vitals [10/06/22 1155]   BP Pulse Resp Temp SpO2   (!) 170/86 70 16 97.8 °F (36.6 °C) (!) 90 %      MAP       --          Physical Exam  Nursing Notes and Vital Signs Reviewed.  Constitutional: Patient is in no apparent distress. Well-developed and well-nourished. Chronically disabled.   Head: Atraumatic. Normocephalic.  Eyes: PERRL. EOM intact. Conjunctivae are not pale. No scleral icterus.  ENT: Mucous membranes are moist. Oropharynx is clear and symmetric.    Neck: Supple. Full ROM. No lymphadenopathy.  Cardiovascular: Regular rate. Regular rhythm. No murmurs, rubs, or gallops. Distal pulses are 2+ and symmetric.  Pulmonary/Chest: No respiratory distress. Clear to auscultation bilaterally. No wheezing or rales.  Abdominal: Soft and non-distended.  There is no tenderness.  No rebound, guarding, or rigidity. Good bowel sounds.  Genitourinary: No CVA tenderness  Musculoskeletal: Moves all extremities. Bilateral BKA.   Skin: Warm and dry.  Neurological:  Alert, awake, and appropriate.  Normal speech.  No acute focal neurological deficits are appreciated.  Psychiatric: Normal affect. Good eye contact. Appropriate in content.     ED Course   Procedures  ED Vital Signs:  Vitals:    10/06/22 1155 10/06/22 1307 10/06/22 1309 10/06/22 1630   BP: (!) 170/86   (!) 180/77   Pulse: 70   78   Resp: 16      Temp: 97.8 °F (36.6 °C)      SpO2: (!) 90% 97%  96%   Weight:   51 kg (112 lb 7 oz)     10/06/22 1806 10/06/22 1835   BP: (!) 204/94 (!) 195/86   Pulse: 83 83   Resp: 18    Temp:     SpO2:     Weight:         Abnormal Lab Results:  Labs Reviewed   CBC W/ AUTO DIFFERENTIAL -  Abnormal; Notable for the following components:       Result Value    RBC 2.74 (*)     Hemoglobin 8.5 (*)     Hematocrit 27.3 (*)      (*)     MCHC 31.1 (*)     RDW 17.9 (*)     Lymph # 0.5 (*)     Gran % 82.6 (*)     Lymph % 8.3 (*)     All other components within normal limits   COMPREHENSIVE METABOLIC PANEL - Abnormal; Notable for the following components:    Potassium 6.2 (*)     CO2 17 (*)     Glucose 69 (*)     BUN 94 (*)     Creatinine 10.2 (*)     Albumin 3.2 (*)     eGFR 4 (*)     All other components within normal limits   TROPONIN I - Abnormal; Notable for the following components:    Troponin I 0.079 (*)     All other components within normal limits   PHOSPHORUS - Abnormal; Notable for the following components:    Phosphorus 9.9 (*)     All other components within normal limits    Narrative:     PHOS critical result(s) called and verbal readback obtained from   CARMELITA CRAMER by MS8 10/06/2022 16:44   SARS-COV-2 RNA AMPLIFICATION, QUAL   MAGNESIUM   B-TYPE NATRIURETIC PEPTIDE   POCT GLUCOSE   POCT GLUCOSE MONITORING CONTINUOUS        All Lab Results:  Results for orders placed or performed during the hospital encounter of 10/06/22   CBC auto differential   Result Value Ref Range    WBC 5.91 3.90 - 12.70 K/uL    RBC 2.74 (L) 4.00 - 5.40 M/uL    Hemoglobin 8.5 (L) 12.0 - 16.0 g/dL    Hematocrit 27.3 (L) 37.0 - 48.5 %     (H) 82 - 98 fL    MCH 31.0 27.0 - 31.0 pg    MCHC 31.1 (L) 32.0 - 36.0 g/dL    RDW 17.9 (H) 11.5 - 14.5 %    Platelets 262 150 - 450 K/uL    MPV 10.7 9.2 - 12.9 fL    Immature Granulocytes 0.5 0.0 - 0.5 %    Gran # (ANC) 4.9 1.8 - 7.7 K/uL    Immature Grans (Abs) 0.03 0.00 - 0.04 K/uL    Lymph # 0.5 (L) 1.0 - 4.8 K/uL    Mono # 0.4 0.3 - 1.0 K/uL    Eos # 0.0 0.0 - 0.5 K/uL    Baso # 0.05 0.00 - 0.20 K/uL    nRBC 0 0 /100 WBC    Gran % 82.6 (H) 38.0 - 73.0 %    Lymph % 8.3 (L) 18.0 - 48.0 %    Mono % 7.3 4.0 - 15.0 %    Eosinophil % 0.5 0.0 - 8.0 %    Basophil % 0.8 0.0 -  1.9 %    Differential Method Automated    COVID-19 Rapid Screening   Result Value Ref Range    SARS-CoV-2 RNA, Amplification, Qual Negative Negative   Comprehensive metabolic panel   Result Value Ref Range    Sodium 138 136 - 145 mmol/L    Potassium 6.2 (H) 3.5 - 5.1 mmol/L    Chloride 106 95 - 110 mmol/L    CO2 17 (L) 23 - 29 mmol/L    Glucose 69 (L) 70 - 110 mg/dL    BUN 94 (H) 8 - 23 mg/dL    Creatinine 10.2 (H) 0.5 - 1.4 mg/dL    Calcium 9.1 8.7 - 10.5 mg/dL    Total Protein 6.5 6.0 - 8.4 g/dL    Albumin 3.2 (L) 3.5 - 5.2 g/dL    Total Bilirubin 0.7 0.1 - 1.0 mg/dL    Alkaline Phosphatase 59 55 - 135 U/L    AST 13 10 - 40 U/L    ALT 11 10 - 44 U/L    Anion Gap 15 8 - 16 mmol/L    eGFR 4 (A) >60 mL/min/1.73 m^2   Troponin I   Result Value Ref Range    Troponin I 0.079 (H) 0.000 - 0.026 ng/mL   Magnesium   Result Value Ref Range    Magnesium 2.3 1.6 - 2.6 mg/dL   Phosphorus   Result Value Ref Range    Phosphorus 9.9 (HH) 2.7 - 4.5 mg/dL   POCT glucose   Result Value Ref Range    POCT Glucose 86 70 - 110 mg/dL         Imaging Results:  Imaging Results              X-Ray Chest AP Portable (Final result)  Result time 10/06/22 12:53:34      Final result by LEANNA May Sr., MD (10/06/22 12:53:34)                   Impression:      The size of the heart is prominent. There is a mild amount of interstitial and alveolar opacities seen in both lungs with Kerley B lines bilaterally.  This is characteristic of pulmonary edema.      Electronically signed by: Joseph May MD  Date:    10/06/2022  Time:    12:53               Narrative:    EXAMINATION:  XR CHEST AP PORTABLE    CLINICAL HISTORY:  shortness of breath;    COMPARISON:  None    FINDINGS:  The size of the heart is prominent.  There is a mild amount of interstitial and alveolar opacities seen in both lungs with Kerley B lines bilaterally.  There is no pneumothorax.  The costophrenic angles are sharp.                                       The EKG was ordered,  reviewed, and independently interpreted by the ED provider.  Interpretation time: 13:21  Rate: 80 BPM  Rhythm: normal sinus rhythm  Interpretation: ST & T wave abnormality, consider inferior ischemia. No STEMI.             The Emergency Provider reviewed the vital signs and test results, which are outlined above.     ED Discussion     4:43 PM: Informed Dr. Pleitez (Nephrology) of pt's condition.     4:53 PM: Discussed pt's case with Dr. Pleitez (Nephrology) who recommends dialysis then discharge     19:00 Patient will be discharged home after hemodialysis.          Medical Decision Making:   Clinical Tests:   Lab Tests: Ordered and Reviewed  Radiological Study: Ordered and Reviewed  Medical Tests: Ordered and Reviewed         ED Medication(s):  Medications   calcium gluconate 1 g in NS IVPB (premixed) (0 g Intravenous Stopped 10/6/22 1807)     And   calcium gluconate 1 g in NS IVPB (premixed) (has no administration in time range)   mupirocin 2 % ointment (has no administration in time range)   sodium zirconium cyclosilicate packet 10 g (10 g Oral Given 10/6/22 1701)   amLODIPine tablet 10 mg (10 mg Oral Given 10/6/22 1818)   hydrALAZINE injection 10 mg (10 mg Intravenous Given 10/6/22 1818)       New Prescriptions    No medications on file               Scribe Attestation:   Scribe #1: I performed the above scribed service and the documentation accurately describes the services I performed. I attest to the accuracy of the note.     Attending:   Physician Attestation Statement for Scribe #1: I, Elizabet Do MD, personally performed the services described in this documentation, as scribed by Di Chambers, in my presence, and it is both accurate and complete.           Clinical Impression       ICD-10-CM ICD-9-CM   1. ESRD (end stage renal disease) on dialysis  N18.6 585.6    Z99.2 V45.11   2. Shortness of breath  R06.02 786.05   3. Hyperkalemia  E87.5 276.7   4. Noncompliance  Z91.199 V15.81   5. S/P bilateral BKA  (below knee amputation)  Z89.512 V49.75    Z89.511    6. Chronic hypertension  I10 401.9       Disposition:   Disposition: Discharged  Condition: Stable       Elizabet Do MD  10/06/22 1911

## 2022-10-06 NOTE — TELEPHONE ENCOUNTER
----- Message from Rubia Cee sent at 10/6/2022 11:31 AM CDT -----  Contact: [phone]  Pt is requesting a call to discuss issues with the nurse. Please call her back at [phone]. Thanks KB

## 2022-10-07 ENCOUNTER — TELEPHONE (OUTPATIENT)
Dept: HEMATOLOGY/ONCOLOGY | Facility: CLINIC | Age: 67
End: 2022-10-07
Payer: MEDICARE

## 2022-10-07 ENCOUNTER — TELEPHONE (OUTPATIENT)
Dept: GASTROENTEROLOGY | Facility: CLINIC | Age: 67
End: 2022-10-07
Payer: MEDICARE

## 2022-10-07 DIAGNOSIS — D64.9 SEVERE ANEMIA: Primary | ICD-10-CM

## 2022-10-07 NOTE — TELEPHONE ENCOUNTER
Patient is schedule for EGD and colonoscopy on 11/17/22.   She has hx of severe anemia. Blood count is trending down. Hematology following.   She needs to have repeat CBC 2 days before scopes (on 11/15/22 to ensure it is about 7.0 and procedures can be safely scheduled.   If under, 7.0, she will need blood transfusion prior to scopes.

## 2022-10-07 NOTE — TELEPHONE ENCOUNTER
----- Message from Pat Garsia NP sent at 10/4/2022 10:45 AM CDT -----  Please let patient know that she has low normal B12 levels.  Would like her to start B12 8872-1347 mcg SL daily.  She can get this OTC.  Thank you,  Pat'

## 2022-10-07 NOTE — PROGRESS NOTES
10/06/22 8990   Post-Hemodialysis Assessment   Rinseback Volume (mL) 250 mL   Blood Volume Processed (Liters) 84 L   Dialyzer Clearance Lightly streaked   Duration of Treatment 210 minutes   Total UF (mL) 2000 mL   Patient Response to Treatment Pt tolerated tx well.

## 2022-10-11 DIAGNOSIS — N18.6 ESRD (END STAGE RENAL DISEASE): Primary | ICD-10-CM

## 2022-10-12 ENCOUNTER — EXTERNAL HOME HEALTH (OUTPATIENT)
Dept: HOME HEALTH SERVICES | Facility: HOSPITAL | Age: 67
End: 2022-10-12
Payer: MEDICARE

## 2022-10-13 ENCOUNTER — DOCUMENT SCAN (OUTPATIENT)
Dept: HOME HEALTH SERVICES | Facility: HOSPITAL | Age: 67
End: 2022-10-13
Payer: MEDICARE

## 2022-10-13 ENCOUNTER — LAB VISIT (OUTPATIENT)
Dept: LAB | Facility: HOSPITAL | Age: 67
End: 2022-10-13
Attending: STUDENT IN AN ORGANIZED HEALTH CARE EDUCATION/TRAINING PROGRAM
Payer: MEDICARE

## 2022-10-13 ENCOUNTER — TELEPHONE (OUTPATIENT)
Dept: HEMATOLOGY/ONCOLOGY | Facility: CLINIC | Age: 67
End: 2022-10-13
Payer: MEDICARE

## 2022-10-13 ENCOUNTER — OFFICE VISIT (OUTPATIENT)
Dept: HEMATOLOGY/ONCOLOGY | Facility: CLINIC | Age: 67
End: 2022-10-13
Payer: MEDICARE

## 2022-10-13 VITALS
HEART RATE: 81 BPM | SYSTOLIC BLOOD PRESSURE: 159 MMHG | OXYGEN SATURATION: 97 % | DIASTOLIC BLOOD PRESSURE: 73 MMHG | RESPIRATION RATE: 18 BRPM

## 2022-10-13 DIAGNOSIS — D53.9 MACROCYTIC ANEMIA: Primary | ICD-10-CM

## 2022-10-13 DIAGNOSIS — E11.8 DIABETES MELLITUS TYPE 2 WITH COMPLICATIONS: ICD-10-CM

## 2022-10-13 DIAGNOSIS — E78.49 OTHER HYPERLIPIDEMIA: ICD-10-CM

## 2022-10-13 DIAGNOSIS — K31.811 AVM (ARTERIOVENOUS MALFORMATION) OF DUODENUM, ACQUIRED WITH HEMORRHAGE: ICD-10-CM

## 2022-10-13 DIAGNOSIS — D64.9 ANEMIA, UNSPECIFIED TYPE: ICD-10-CM

## 2022-10-13 DIAGNOSIS — Z89.619 HISTORY OF ABOVE KNEE AMPUTATION, UNSPECIFIED LATERALITY: ICD-10-CM

## 2022-10-13 DIAGNOSIS — Z99.2 ESRD (END STAGE RENAL DISEASE) ON DIALYSIS: ICD-10-CM

## 2022-10-13 DIAGNOSIS — N18.6 ESRD (END STAGE RENAL DISEASE) ON DIALYSIS: ICD-10-CM

## 2022-10-13 DIAGNOSIS — D53.9 MACROCYTIC ANEMIA: ICD-10-CM

## 2022-10-13 DIAGNOSIS — I10 ESSENTIAL HYPERTENSION: ICD-10-CM

## 2022-10-13 PROCEDURE — 1126F AMNT PAIN NOTED NONE PRSNT: CPT | Mod: CPTII,S$GLB,, | Performed by: STUDENT IN AN ORGANIZED HEALTH CARE EDUCATION/TRAINING PROGRAM

## 2022-10-13 PROCEDURE — 3078F DIAST BP <80 MM HG: CPT | Mod: CPTII,S$GLB,, | Performed by: STUDENT IN AN ORGANIZED HEALTH CARE EDUCATION/TRAINING PROGRAM

## 2022-10-13 PROCEDURE — 1160F RVW MEDS BY RX/DR IN RCRD: CPT | Mod: CPTII,S$GLB,, | Performed by: STUDENT IN AN ORGANIZED HEALTH CARE EDUCATION/TRAINING PROGRAM

## 2022-10-13 PROCEDURE — 1159F MED LIST DOCD IN RCRD: CPT | Mod: CPTII,S$GLB,, | Performed by: STUDENT IN AN ORGANIZED HEALTH CARE EDUCATION/TRAINING PROGRAM

## 2022-10-13 PROCEDURE — 99999 PR PBB SHADOW E&M-EST. PATIENT-LVL IV: ICD-10-PCS | Mod: PBBFAC,,, | Performed by: STUDENT IN AN ORGANIZED HEALTH CARE EDUCATION/TRAINING PROGRAM

## 2022-10-13 PROCEDURE — 82668 ASSAY OF ERYTHROPOIETIN: CPT | Performed by: STUDENT IN AN ORGANIZED HEALTH CARE EDUCATION/TRAINING PROGRAM

## 2022-10-13 PROCEDURE — 1160F PR REVIEW ALL MEDS BY PRESCRIBER/CLIN PHARMACIST DOCUMENTED: ICD-10-PCS | Mod: CPTII,S$GLB,, | Performed by: STUDENT IN AN ORGANIZED HEALTH CARE EDUCATION/TRAINING PROGRAM

## 2022-10-13 PROCEDURE — 36415 COLL VENOUS BLD VENIPUNCTURE: CPT | Performed by: STUDENT IN AN ORGANIZED HEALTH CARE EDUCATION/TRAINING PROGRAM

## 2022-10-13 PROCEDURE — 4010F ACE/ARB THERAPY RXD/TAKEN: CPT | Mod: CPTII,S$GLB,, | Performed by: STUDENT IN AN ORGANIZED HEALTH CARE EDUCATION/TRAINING PROGRAM

## 2022-10-13 PROCEDURE — 99205 PR OFFICE/OUTPT VISIT, NEW, LEVL V, 60-74 MIN: ICD-10-PCS | Mod: S$GLB,,, | Performed by: STUDENT IN AN ORGANIZED HEALTH CARE EDUCATION/TRAINING PROGRAM

## 2022-10-13 PROCEDURE — 1159F PR MEDICATION LIST DOCUMENTED IN MEDICAL RECORD: ICD-10-PCS | Mod: CPTII,S$GLB,, | Performed by: STUDENT IN AN ORGANIZED HEALTH CARE EDUCATION/TRAINING PROGRAM

## 2022-10-13 PROCEDURE — 3044F HG A1C LEVEL LT 7.0%: CPT | Mod: CPTII,S$GLB,, | Performed by: STUDENT IN AN ORGANIZED HEALTH CARE EDUCATION/TRAINING PROGRAM

## 2022-10-13 PROCEDURE — 99205 OFFICE O/P NEW HI 60 MIN: CPT | Mod: S$GLB,,, | Performed by: STUDENT IN AN ORGANIZED HEALTH CARE EDUCATION/TRAINING PROGRAM

## 2022-10-13 PROCEDURE — 3077F SYST BP >= 140 MM HG: CPT | Mod: CPTII,S$GLB,, | Performed by: STUDENT IN AN ORGANIZED HEALTH CARE EDUCATION/TRAINING PROGRAM

## 2022-10-13 PROCEDURE — 3044F PR MOST RECENT HEMOGLOBIN A1C LEVEL <7.0%: ICD-10-PCS | Mod: CPTII,S$GLB,, | Performed by: STUDENT IN AN ORGANIZED HEALTH CARE EDUCATION/TRAINING PROGRAM

## 2022-10-13 PROCEDURE — 1126F PR PAIN SEVERITY QUANTIFIED, NO PAIN PRESENT: ICD-10-PCS | Mod: CPTII,S$GLB,, | Performed by: STUDENT IN AN ORGANIZED HEALTH CARE EDUCATION/TRAINING PROGRAM

## 2022-10-13 PROCEDURE — 3077F PR MOST RECENT SYSTOLIC BLOOD PRESSURE >= 140 MM HG: ICD-10-PCS | Mod: CPTII,S$GLB,, | Performed by: STUDENT IN AN ORGANIZED HEALTH CARE EDUCATION/TRAINING PROGRAM

## 2022-10-13 PROCEDURE — 3078F PR MOST RECENT DIASTOLIC BLOOD PRESSURE < 80 MM HG: ICD-10-PCS | Mod: CPTII,S$GLB,, | Performed by: STUDENT IN AN ORGANIZED HEALTH CARE EDUCATION/TRAINING PROGRAM

## 2022-10-13 PROCEDURE — 4010F PR ACE/ARB THEARPY RXD/TAKEN: ICD-10-PCS | Mod: CPTII,S$GLB,, | Performed by: STUDENT IN AN ORGANIZED HEALTH CARE EDUCATION/TRAINING PROGRAM

## 2022-10-13 PROCEDURE — 99999 PR PBB SHADOW E&M-EST. PATIENT-LVL IV: CPT | Mod: PBBFAC,,, | Performed by: STUDENT IN AN ORGANIZED HEALTH CARE EDUCATION/TRAINING PROGRAM

## 2022-10-13 RX ORDER — MULTIVITAMIN
1 TABLET ORAL DAILY
Qty: 90 TABLET | Refills: 3 | Status: SHIPPED | OUTPATIENT
Start: 2022-10-13

## 2022-10-13 NOTE — ASSESSMENT & PLAN NOTE
Likely chronic with ESRD on HD.  10/2022 labs with borderline low vitamin b12 and folate. Iron labs unreliable with recent transfusions for UGI bleeding. Other causes of macrocytosis include reticulocytosis.  -recommend vitamin b12 and folic acid supplementation given risk for recurrent, chronic occult blood loss from AVM  -check epo  -see ESRD  -follow up as needed  -transfuse for Hg <7

## 2022-10-13 NOTE — ASSESSMENT & PLAN NOTE
Has been a few weeks since she last had melena.  She says it is intermittent.  Hemoglobin stable  -continue to monitor  -dialysis center providing iron supplementation

## 2022-10-13 NOTE — ASSESSMENT & PLAN NOTE
[facility] ([address])  [phone](Center Phone)  Will call to confirm if she is getting epo   --called and confirmed that she is getting epo at her dialysis center.    mircera 75 mcg on 10/7/22  Completed load of iron and will transition to maintenance dose  Will defer to dialysis regarding management of iron and epo

## 2022-10-13 NOTE — PROGRESS NOTES
PATIENT: Fariha Corral  MRN: [id number]  DATE: 10/13/2022      Reason for consult:   Chief Complaint   Patient presents with    Anemia         Oncologic History:   Oncologic History    Oncologic History      Oncologic Treatment      Pathology           Subjective:   History of Present Illness: Ms. Fariha Corral is a 67 y.o. female who I'm asked to see for anemia    67 year old woman with multiple comorbidities including ESRD on HD and recent admission for UGI bleed 2022 for severe symptomatic anemia (hg 2) requiring transfusion.  Upper endoscopy revealed duodenal AVM.    She reports she has had intermittent episodes of melena for a long time although she says she hasn't had one in the past 3 weeks.  She says she is getting iron from her dialysis center; she is unsure if she is getting epo    She is alone at this visit.    Past Medical History:   Past Medical History:   Diagnosis Date    Acid reflux     Chronic kidney disease, unspecified     Hypertension        Past Surgical History:   Past Surgical History:   Procedure Laterality Date    AMPUTATION      HYSTERECTOMY         Family History: History reviewed. No pertinent family history.    Social History:  reports that she has been smoking cigarettes. She uses smokeless tobacco. She reports that she does not drink alcohol.    Allergies:  Review of patient's allergies indicates:   Allergen Reactions    Aspirin Hives    Latex        Medications:  Current Outpatient Medications   Medication Sig Dispense Refill    amLODIPine (NORVASC) 10 MG tablet amlodipine 10 mg tablet      atorvastatin (LIPITOR) 40 MG tablet SMARTSI Tablet(s) By Mouth Every Evening      calcium acetate,phosphat bind, (PHOSLO) 667 mg capsule Take 667 mg by mouth.      carvediloL (COREG) 12.5 MG tablet Take 12.5 mg by mouth 2 (two) times daily.      ferrous sulfate (FEOSOL) 325 mg (65 mg iron) Tab tablet ferrous sulfate 325 mg (65 mg iron) tablet   TAKE ONE TABLET BY MOUTH ONE TIME DAILY  FOR 30 DAYS      gabapentin (NEURONTIN) 300 MG capsule Take 1 capsule (300 mg total) by mouth 3 (three) times daily. 90 capsule 1    HYDROcodone-acetaminophen (NORCO) 5-325 mg per tablet Take 1 tablet by mouth every 8 (eight) hours as needed.      LOKELMA 5 gram packet Take by mouth 3 (three) times a week.      pantoprazole (PROTONIX) 40 MG tablet Take 1 tablet (40 mg total) by mouth 2 (two) times daily. 60 tablet 3    paroxetine (PAXIL) 10 MG tablet Take 10 mg by mouth once daily.      spironolactone (ALDACTONE) 50 MG tablet Take 50 mg by mouth once daily.      multivit with min-folic acid 0.4 mg Tab Take 1 tablet by mouth Daily. 90 tablet 3     No current facility-administered medications for this visit.         ROS:  Review of Systems   Constitutional:  Negative for activity change, appetite change, chills, diaphoresis, fatigue, fever and unexpected weight change.   HENT:  Negative for nosebleeds and trouble swallowing.    Eyes:  Negative for visual disturbance.   Respiratory:  Negative for cough, chest tightness, shortness of breath and wheezing.    Cardiovascular:  Negative for chest pain and leg swelling.   Gastrointestinal:  Negative for abdominal distention, abdominal pain, constipation, diarrhea, nausea and vomiting.   Endocrine: Negative for cold intolerance and heat intolerance.   Genitourinary:  Negative for difficulty urinating and dysuria.   Musculoskeletal:  Negative for arthralgias and back pain.   Skin:  Negative for color change.   Neurological:  Negative for dizziness, weakness, light-headedness, numbness and headaches.   Hematological:  Negative for adenopathy. Does not bruise/bleed easily.   Psychiatric/Behavioral:  Negative for confusion.        ECOG Performance Status:   ECOG SCORE    2 - Capable of all selfcare but unable to carry out any work activities, active > 50% of hours         Objective:      Vitals:   Vitals:    10/13/22 1027   BP: (!) 159/73   Pulse: 81   Resp: 18   SpO2: 97%      BMI: There is no height or weight on file to calculate BMI.    Physical Exam:  Physical Exam  Constitutional:       General: She is not in acute distress.     Appearance: She is not ill-appearing.      Comments: In wheelchair   HENT:      Head: Normocephalic and atraumatic.      Mouth/Throat:      Pharynx: No oropharyngeal exudate or posterior oropharyngeal erythema.   Eyes:      General: No scleral icterus.     Extraocular Movements: Extraocular movements intact.      Conjunctiva/sclera: Conjunctivae normal.      Pupils: Pupils are equal, round, and reactive to light.   Cardiovascular:      Rate and Rhythm: Normal rate and regular rhythm.      Heart sounds: No murmur heard.    No friction rub. No gallop.   Pulmonary:      Effort: Pulmonary effort is normal. No respiratory distress.      Breath sounds: No stridor. No wheezing, rhonchi or rales.   Abdominal:      General: Bowel sounds are normal. There is no distension.      Palpations: Abdomen is soft. There is no mass.      Tenderness: There is no abdominal tenderness. There is no guarding or rebound.   Musculoskeletal:         General: Normal range of motion.      Cervical back: Normal range of motion and neck supple.      Comments: Bilateral above knee amputation   Skin:     General: Skin is warm and dry.   Neurological:      General: No focal deficit present.      Mental Status: She is alert.         Laboratory Data:  Recent Results (from the past 168 hour(s))   CBC auto differential    Collection Time: 10/06/22  1:07 PM   Result Value Ref Range    WBC 5.91 3.90 - 12.70 K/uL    RBC 2.74 (L) 4.00 - 5.40 M/uL    Hemoglobin 8.5 (L) 12.0 - 16.0 g/dL    Hematocrit 27.3 (L) 37.0 - 48.5 %     (H) 82 - 98 fL    MCH 31.0 27.0 - 31.0 pg    MCHC 31.1 (L) 32.0 - 36.0 g/dL    RDW 17.9 (H) 11.5 - 14.5 %    Platelets 262 150 - 450 K/uL    MPV 10.7 9.2 - 12.9 fL    Immature Granulocytes 0.5 0.0 - 0.5 %    Gran # (ANC) 4.9 1.8 - 7.7 K/uL    Immature Grans (Abs) 0.03  0.00 - 0.04 K/uL    Lymph # 0.5 (L) 1.0 - 4.8 K/uL    Mono # 0.4 0.3 - 1.0 K/uL    Eos # 0.0 0.0 - 0.5 K/uL    Baso # 0.05 0.00 - 0.20 K/uL    nRBC 0 0 /100 WBC    Gran % 82.6 (H) 38.0 - 73.0 %    Lymph % 8.3 (L) 18.0 - 48.0 %    Mono % 7.3 4.0 - 15.0 %    Eosinophil % 0.5 0.0 - 8.0 %    Basophil % 0.8 0.0 - 1.9 %    Differential Method Automated    COVID-19 Rapid Screening    Collection Time: 10/06/22  1:07 PM   Result Value Ref Range    SARS-CoV-2 RNA, Amplification, Qual Negative Negative   Comprehensive metabolic panel    Collection Time: 10/06/22  3:50 PM   Result Value Ref Range    Sodium 138 136 - 145 mmol/L    Potassium 6.2 (H) 3.5 - 5.1 mmol/L    Chloride 106 95 - 110 mmol/L    CO2 17 (L) 23 - 29 mmol/L    Glucose 69 (L) 70 - 110 mg/dL    BUN 94 (H) 8 - 23 mg/dL    Creatinine 10.2 (H) 0.5 - 1.4 mg/dL    Calcium 9.1 8.7 - 10.5 mg/dL    Total Protein 6.5 6.0 - 8.4 g/dL    Albumin 3.2 (L) 3.5 - 5.2 g/dL    Total Bilirubin 0.7 0.1 - 1.0 mg/dL    Alkaline Phosphatase 59 55 - 135 U/L    AST 13 10 - 40 U/L    ALT 11 10 - 44 U/L    Anion Gap 15 8 - 16 mmol/L    eGFR 4 (A) >60 mL/min/1.73 m^2   Troponin I    Collection Time: 10/06/22  3:50 PM   Result Value Ref Range    Troponin I 0.079 (H) 0.000 - 0.026 ng/mL   Magnesium    Collection Time: 10/06/22  3:50 PM   Result Value Ref Range    Magnesium 2.3 1.6 - 2.6 mg/dL   Phosphorus    Collection Time: 10/06/22  3:50 PM   Result Value Ref Range    Phosphorus 9.9 (HH) 2.7 - 4.5 mg/dL   POCT glucose    Collection Time: 10/06/22  5:05 PM   Result Value Ref Range    POCT Glucose 86 70 - 110 mg/dL       Imaging:       Assessment:     1. Macrocytic anemia    2. Diabetes mellitus type 2 with complications    3. Essential hypertension    4. Other hyperlipidemia    5. AVM (arteriovenous malformation) of duodenum, acquired with hemorrhage    6. Anemia, unspecified type    7. ESRD (end stage renal disease) on dialysis    8. History of above knee amputation, unspecified laterality           Plan:     Problem List Items Addressed This Visit          Cardiac/Vascular    Essential hypertension    Current Assessment & Plan     bp adequately controlled  -continue current regimen         Other hyperlipidemia    Current Assessment & Plan     Continue statin            Renal/    ESRD (end stage renal disease) on dialysis    Current Assessment & Plan     [facility] ([address])  [phone](Center Phone)  Will call to confirm if she is getting epo   --called and confirmed that she is getting epo at her dialysis center.    mircera 75 mcg on 10/7/22  Completed load of iron and will transition to maintenance dose  Will defer to dialysis regarding management of iron and epo         Relevant Medications    multivit with min-folic acid 0.4 mg Tab    Other Relevant Orders    ERYTHROPOIETIN       Oncology    Macrocytic anemia - Primary    Current Assessment & Plan     Likely chronic with ESRD on HD.  10/2022 labs with borderline low vitamin b12 and folate. Iron labs unreliable with recent transfusions for UGI bleeding. Other causes of macrocytosis include reticulocytosis.  -recommend vitamin b12 and folic acid supplementation given risk for recurrent, chronic occult blood loss from AVM  -check epo  -see ESRD  -follow up as needed  -transfuse for Hg <7         Relevant Orders    ERYTHROPOIETIN       Endocrine    Diabetes mellitus type 2 with complications    Overview     DM type 2, PVD with bilateral AKA            GI    AVM (arteriovenous malformation) of duodenum, acquired with hemorrhage    Overview     Patient came to ED 7/2022 with H&H of 2.9/9.9 history of GI bleeding for over 1 month. Patient was complaining of abdominal pain on presentation. Patient was then started on 2 packed red blood cells in the ED and transferred to ICU. Patient also received 2 blood cells via dialysis on 7/11.   EGD with GI on 7/12/22 and found to have actively bleeding AVM in proximal D2 treated  with Gold probe and 1 endoclip         Current Assessment & Plan     Has been a few weeks since she last had melena.  She says it is intermittent.  Hemoglobin stable  -continue to monitor  -dialysis center providing iron supplementation            Orthopedic    History of above-knee amputation (bilateral)     Other Visit Diagnoses       Anemia, unspecified type        Relevant Medications    multivit with min-folic acid 0.4 mg Tab    Other Relevant Orders    ERYTHROPOIETIN            Mike Ritter MD  Hematology Oncology

## 2022-10-14 ENCOUNTER — DOCUMENT SCAN (OUTPATIENT)
Dept: HOME HEALTH SERVICES | Facility: HOSPITAL | Age: 67
End: 2022-10-14
Payer: MEDICARE

## 2022-10-17 LAB — EPO SERPL-ACNC: 83.2 MIU/ML (ref 2.6–18.5)

## 2022-11-30 ENCOUNTER — HOSPITAL ENCOUNTER (OUTPATIENT)
Dept: PREADMISSION TESTING | Facility: HOSPITAL | Age: 67
Discharge: HOME | End: 2022-11-30
Attending: NURSE PRACTITIONER
Payer: MEDICARE

## 2022-11-30 DIAGNOSIS — Z87.19 HISTORY OF GI BLEED: ICD-10-CM

## 2022-11-30 DIAGNOSIS — K31.811 AVM (ARTERIOVENOUS MALFORMATION) OF DUODENUM, ACQUIRED WITH HEMORRHAGE: ICD-10-CM

## 2022-11-30 DIAGNOSIS — D64.9 SEVERE ANEMIA: ICD-10-CM

## 2023-01-16 PROBLEM — K31.811 AVM (ARTERIOVENOUS MALFORMATION) OF DUODENUM, ACQUIRED WITH HEMORRHAGE: Status: RESOLVED | Noted: 2022-10-13 | Resolved: 2023-01-16

## 2023-04-26 ENCOUNTER — TELEPHONE (OUTPATIENT)
Dept: GASTROENTEROLOGY | Facility: CLINIC | Age: 68
End: 2023-04-26
Payer: MEDICARE

## 2023-04-26 NOTE — TELEPHONE ENCOUNTER
Marquita, caregiver, requesting to schedule appt from recommendation given from pt's hospital discharge.  CG requested to have referral faxed to clinic at [phone].  Acknowledged understanding.    PERRL/EOMI detailed exam

## 2023-04-26 NOTE — TELEPHONE ENCOUNTER
----- Message from Gabriela Ramos sent at 4/26/2023 10:12 AM CDT -----  Regarding: Marquita [phone]  Contact: Marquita [phone]  Type:  Sooner Apoointment Request    Caller is requesting a sooner appointment.  Caller declined first available appointment listed below.  Caller will not accept being placed on the waitlist and is requesting a message be sent to doctor.  Name of Caller: PT   When is the first available appointment? Was asked to send a message   Symptoms: hospital follow up   Would the patient rather a call back or a response via [facility]? Call back   Best Call Back Number: Marquita [phone]  Additional Information:

## 2023-05-17 ENCOUNTER — TELEPHONE (OUTPATIENT)
Dept: GASTROENTEROLOGY | Facility: CLINIC | Age: 68
End: 2023-05-17
Payer: MEDICARE

## 2023-05-17 NOTE — TELEPHONE ENCOUNTER
----- Message from Laila Valero sent at 5/12/2023 10:04 AM CDT -----  Contact: Óscar Todd  Type:  Needs Medical Advice    Who Called: [facility]  Symptoms (please be specific): sent referral on May3 needs to schedule appt for Double balloon Endoscopy   Would the patient rather a call back or a response via [facility]? call  Best Call Back Number: [phone]  Additional Information:

## 2023-05-17 NOTE — TELEPHONE ENCOUNTER
----- Message from Josette Duran sent at 5/17/2023 10:17 AM CDT -----  Regarding: /[facility]  Contact: [phone]/[facility] is requesting a call back regarding scheduling the patient. She faxed a referral with all information on 05/03.   Would the patient rather a call back or a response via [facility]?  Call   Best Call Back Number:  [phone]/[facility]    Additional Information:

## 2023-06-05 ENCOUNTER — OFFICE VISIT (OUTPATIENT)
Dept: GASTROENTEROLOGY | Facility: CLINIC | Age: 68
End: 2023-06-05
Payer: MEDICARE

## 2023-06-05 VITALS
SYSTOLIC BLOOD PRESSURE: 127 MMHG | DIASTOLIC BLOOD PRESSURE: 60 MMHG | HEIGHT: 64 IN | HEART RATE: 62 BPM | BODY MASS INDEX: 19.3 KG/M2

## 2023-06-05 DIAGNOSIS — D50.9 IRON DEFICIENCY ANEMIA, UNSPECIFIED IRON DEFICIENCY ANEMIA TYPE: Primary | ICD-10-CM

## 2023-06-05 PROCEDURE — 3078F DIAST BP <80 MM HG: CPT | Mod: CPTII,S$GLB,, | Performed by: INTERNAL MEDICINE

## 2023-06-05 PROCEDURE — 1101F PR PT FALLS ASSESS DOC 0-1 FALLS W/OUT INJ PAST YR: ICD-10-PCS | Mod: CPTII,S$GLB,, | Performed by: INTERNAL MEDICINE

## 2023-06-05 PROCEDURE — 3074F SYST BP LT 130 MM HG: CPT | Mod: CPTII,S$GLB,, | Performed by: INTERNAL MEDICINE

## 2023-06-05 PROCEDURE — 3288F FALL RISK ASSESSMENT DOCD: CPT | Mod: CPTII,S$GLB,, | Performed by: INTERNAL MEDICINE

## 2023-06-05 PROCEDURE — 99214 OFFICE O/P EST MOD 30 MIN: CPT | Mod: S$GLB,,, | Performed by: INTERNAL MEDICINE

## 2023-06-05 PROCEDURE — 3078F PR MOST RECENT DIASTOLIC BLOOD PRESSURE < 80 MM HG: ICD-10-PCS | Mod: CPTII,S$GLB,, | Performed by: INTERNAL MEDICINE

## 2023-06-05 PROCEDURE — 99214 PR OFFICE/OUTPT VISIT, EST, LEVL IV, 30-39 MIN: ICD-10-PCS | Mod: S$GLB,,, | Performed by: INTERNAL MEDICINE

## 2023-06-05 PROCEDURE — 3288F PR FALLS RISK ASSESSMENT DOCUMENTED: ICD-10-PCS | Mod: CPTII,S$GLB,, | Performed by: INTERNAL MEDICINE

## 2023-06-05 PROCEDURE — 1101F PT FALLS ASSESS-DOCD LE1/YR: CPT | Mod: CPTII,S$GLB,, | Performed by: INTERNAL MEDICINE

## 2023-06-05 PROCEDURE — 3074F PR MOST RECENT SYSTOLIC BLOOD PRESSURE < 130 MM HG: ICD-10-PCS | Mod: CPTII,S$GLB,, | Performed by: INTERNAL MEDICINE

## 2023-06-05 PROCEDURE — 1125F AMNT PAIN NOTED PAIN PRSNT: CPT | Mod: CPTII,S$GLB,, | Performed by: INTERNAL MEDICINE

## 2023-06-05 PROCEDURE — 3008F PR BODY MASS INDEX (BMI) DOCUMENTED: ICD-10-PCS | Mod: CPTII,S$GLB,, | Performed by: INTERNAL MEDICINE

## 2023-06-05 PROCEDURE — 3008F BODY MASS INDEX DOCD: CPT | Mod: CPTII,S$GLB,, | Performed by: INTERNAL MEDICINE

## 2023-06-05 PROCEDURE — 1159F PR MEDICATION LIST DOCUMENTED IN MEDICAL RECORD: ICD-10-PCS | Mod: CPTII,S$GLB,, | Performed by: INTERNAL MEDICINE

## 2023-06-05 PROCEDURE — 1159F MED LIST DOCD IN RCRD: CPT | Mod: CPTII,S$GLB,, | Performed by: INTERNAL MEDICINE

## 2023-06-05 PROCEDURE — 1125F PR PAIN SEVERITY QUANTIFIED, PAIN PRESENT: ICD-10-PCS | Mod: CPTII,S$GLB,, | Performed by: INTERNAL MEDICINE

## 2023-06-05 RX ORDER — DICYCLOMINE HYDROCHLORIDE 10 MG/1
10 CAPSULE ORAL 3 TIMES DAILY
Qty: 90 CAPSULE | Refills: 0 | Status: SHIPPED | OUTPATIENT
Start: 2023-06-05 | End: 2023-07-05

## 2023-06-05 NOTE — PROGRESS NOTES
"[facility]    CC: Anemia     HPI 68 y.o. female h/o ESRD on HD presents with complaint of anemia with recurrent GI bleeding with melena over the five years. When she has gone into the ED in the past it is because her stools were black and she was concerned about active bleeding. Previous evaluation has included EGD significant for actively bleeding duodenal AVM treated. She thinks she has had a colonoscopy in the past but not during these episodes of bleeding. She also thinks she may have had a video capsule endoscopy but does not remember when.     Recently admitted to the hospital anemia and GI bleeding with melena.  She received one unit of PRBC during that admission. She takes oral iron daily. Does not think she has received IV iron infusions in the past. Not on blood thinners.     Patient also endorsing chronic epigastric abdominal pain that has been present for many years. Pain is constant and not related to food. Previous EGD and CT scan unrevealing for source of pain. She denies excessive NSAID use.Denies constipation or diarrhea.     Past Medical History  Past Medical History:   Diagnosis Date    Acid reflux     Chronic kidney disease, unspecified     Hypertension          Physical Examination  /60   Pulse 62   Ht 5' 4" (1.626 m)   BMI 19.30 kg/m²   General appearance: alert, cooperative, no distress, thin appearing   Lungs: clear to auscultation bilaterally, no dullness to percussion bilaterally  Heart: regular rate and rhythm without rub; no displacement of the PMI   Abdomen: soft, non-tender; bowel sounds normoactive; no organomegaly  Extremities: Bilateral above knee amputation     Lab Results   Component Value Date    WBC 7.0 01/05/2023    HGB 7.6 (L) 01/05/2023    HCT 23.6 (L) 01/05/2023     (H) 10/06/2022     01/05/2023         Assessment:   67 y/o F presenting with:   Iron deficiency anemia possible due to GI blood loss with previous EGD at [facility] showing bleeding " duodenal AVM. Anemia of chronic disease likely contributing to anemia, follows with nephrologist per chart review she receives EPO. This is a chronic illness with exacerbation   Chronic epigastric abdominal pain. No improvement with PPI. Previous EGD unrevealing. CT abdomen/pelvis 2022 unrevealing.     Plan:  - VCE discussed and preliminarily scheduled for June 20th. However, after further review with caregiver, will cancel VCE and plan upper single balloon endoscopy on Thursday, July 20th. (Endoscopy with multiple risk factors)   - Recommend aggressive iron supplementation.  Defer to hematology to set up parenteral iron infusions   - Trial of Bentyl 10 mg BID for two weeks  - Counseled on smoking cessation       Kvng Ballesteros MD   [address], [address]   [place] [address] [phone]

## 2023-06-12 DIAGNOSIS — E61.1 IRON DEFICIENCY: ICD-10-CM

## 2023-06-12 DIAGNOSIS — D51.0 PERNICIOUS ANEMIA: Primary | ICD-10-CM

## 2023-07-13 ENCOUNTER — TELEPHONE (OUTPATIENT)
Dept: GASTROENTEROLOGY | Facility: CLINIC | Age: 68
End: 2023-07-13
Payer: MEDICARE

## 2023-07-13 NOTE — TELEPHONE ENCOUNTER
Upper SBE rescheduled with Laila Nurse with [facility] at [phone], to Thursday, July 27, 2023 at [facility] at [address] [place] [address].  Instructions given to eat light meals with nothing to eat or drink after midnight.  Notified Endoscopy staff will contact to give arrival time.  Laila repeated instructions given correctly.

## 2023-07-14 ENCOUNTER — TELEPHONE (OUTPATIENT)
Dept: GASTROENTEROLOGY | Facility: CLINIC | Age: 68
End: 2023-07-14
Payer: MEDICARE

## 2023-07-14 NOTE — TELEPHONE ENCOUNTER
Crystal with [facility] requesting arrival time for upper sbe on 7127/23.  Notified Endoscopy staff will contact 2-3 days prior to procedure to give arrival time.  Pt to have light meals day prior to procedure with nothing to eat or drink after midnight.

## 2023-07-14 NOTE — TELEPHONE ENCOUNTER
----- Message from Katy Ventura sent at 7/14/2023  9:25 AM CDT -----  Pt Requesting Call Back    Who called: Crystal Burgess, pt's care taker at [facility]  Who called for pt:    Best call back #: [phone](Crystal) or  (Ray, ask for pt's nurse located on 3rd floor) [phone](fax; Attn to Crystal in case any orders or blood work for clearance that needs to be faxed over)    Add notes: caller says pt is a resident at [facility] and she needs to know by Monday  (week of procedurethe time and instructions of pt's procedure set for 7/27/23 in order to have pt prepared on time

## 2023-07-18 ENCOUNTER — TELEPHONE (OUTPATIENT)
Dept: GASTROENTEROLOGY | Facility: CLINIC | Age: 68
End: 2023-07-18
Payer: MEDICARE

## 2023-07-18 NOTE — TELEPHONE ENCOUNTER
rBett with [facility] requested if clearance required prior to upper sbe.  Notified none at this time.  Acknowledged understanding.

## 2023-07-25 ENCOUNTER — TELEPHONE (OUTPATIENT)
Dept: ENDOSCOPY | Facility: HOSPITAL | Age: 68
End: 2023-07-25
Payer: MEDICARE

## 2023-07-25 NOTE — TELEPHONE ENCOUNTER
Spoke with PTS NURSE BOURNE at [facility], patient about arrival time @ *. 1100  Covid test =    NPO status reviewed: Patient must have nothing to eat after midnight.  Pt may have CLEAR liquids ONLY until completely NPO 3 hrs @ *800    Medications: Do not take Insulin or oral diabetic medications the day of the procedure.  Take as prescribed: heart, seizure and blood pressure medication in the morning with a sip of water (less than an ounce).  Take any breathing medications and bring inhalers to hospital with you Leave all valuables and jewelry at home.     Wear comfortable clothes to procedure to change into hospital gown You cannot drive for 24 hours after your procedure because you will receive sedation for your procedure to make you comfortable.  A ride must be provided at discharge.